# Patient Record
Sex: FEMALE | Race: OTHER | HISPANIC OR LATINO | Employment: FULL TIME | URBAN - METROPOLITAN AREA
[De-identification: names, ages, dates, MRNs, and addresses within clinical notes are randomized per-mention and may not be internally consistent; named-entity substitution may affect disease eponyms.]

---

## 2022-11-10 PROBLEM — E55.9 VITAMIN D DEFICIENCY: Status: ACTIVE | Noted: 2022-11-10

## 2022-11-10 PROBLEM — Z23 FLU VACCINE NEED: Status: ACTIVE | Noted: 2022-11-10

## 2023-02-28 PROBLEM — E66.813 OBESITY, CLASS III, BMI 40-49.9 (MORBID OBESITY): Status: ACTIVE | Noted: 2022-05-05

## 2023-04-02 ENCOUNTER — OFFICE VISIT (OUTPATIENT)
Dept: URGENT CARE | Facility: MEDICAL CENTER | Age: 23
End: 2023-04-02

## 2023-04-02 VITALS
OXYGEN SATURATION: 98 % | HEART RATE: 97 BPM | HEIGHT: 64 IN | WEIGHT: 260 LBS | BODY MASS INDEX: 44.39 KG/M2 | TEMPERATURE: 98.2 F | RESPIRATION RATE: 20 BRPM

## 2023-04-02 DIAGNOSIS — L70.0 ACNE VULGARIS: Primary | ICD-10-CM

## 2023-04-02 RX ORDER — CLINDAMYCIN PHOSPHATE 10 UG/ML
LOTION TOPICAL 2 TIMES DAILY
Qty: 60 ML | Refills: 0 | Status: SHIPPED | OUTPATIENT
Start: 2023-04-02

## 2023-04-02 NOTE — PROGRESS NOTES
[employer]        NAME: Joellen Moran is a 25 y o  female  : 2000    MRN: [id number]  DATE: 2023  TIME: 5:41 PM    Assessment and Plan   Acne vulgaris [L70 0]  1  Acne vulgaris  clindamycin (CLEOCIN T) 1 % lotion            Patient Instructions       Follow up with PCP as needed      Chief Complaint     Chief Complaint   Patient presents with   • Rash     Patient started yesterday with rash on her face yesterday and it is spreading down her neck  She used hydrocortisone cream on her face with no relief also a bendaryl tab  History of Present Illness       Patient with several day history of increasing rash on forehead cheeks and some down to her left chin  No new soaps or detergents  She tried 1 dose of Benadryl and some hydrocortisone which did not help  They are not itching  Review of Systems   Review of Systems   All other systems reviewed and are negative          Current Medications       Current Outpatient Medications:   •  clindamycin (CLEOCIN T) 1 % lotion, Apply topically 2 (two) times a day, Disp: 60 mL, Rfl: 0  •  norgestimate-ethinyl estradiol (Phoebe) 0 25-35 MG-MCG per tablet, Take 1 tablet by mouth daily, Disp: 84 tablet, Rfl: 1  •  spironolactone (ALDACTONE) 50 mg tablet, Take 1 tablet (50 mg total) by mouth daily, Disp: 90 tablet, Rfl: 1    Current Allergies     Allergies as of 2023   • (No Known Allergies)            The following portions of the patient's history were reviewed and updated as appropriate: allergies, current medications, past family history, past medical history, past social history, past surgical history and problem list      Past Medical History:   Diagnosis Date   • Polycystic ovarian syndrome    • Prediabetes        Past Surgical History:   Procedure Laterality Date   • NO PAST SURGERIES         Family History   Problem Relation Age of Onset   • No Known Problems Mother    • Diabetes Father    • Cancer Maternal Uncle    • No "Known Problems Maternal Grandmother    • Diabetes Maternal Grandfather    • Diabetes Paternal Grandmother    • Breast cancer Family    • Mental illness Neg Hx    • Substance Abuse Neg Hx          Medications have been verified  Objective   Pulse 97   Temp 98 2 °F (36 8 °C)   Resp 20   Ht 5' 4\" (1 626 m)   Wt 118 kg (260 lb)   LMP 03/20/2023   SpO2 98%   BMI 44 63 kg/m²   Patient's last menstrual period was 03/20/2023  Physical Exam     Physical Exam  Vitals and nursing note reviewed  Constitutional:       Appearance: Normal appearance  She is obese  Cardiovascular:      Rate and Rhythm: Normal rate and regular rhythm  Pulses: Normal pulses  Heart sounds: Normal heart sounds  Pulmonary:      Effort: Pulmonary effort is normal       Breath sounds: Normal breath sounds  Neurological:      Mental Status: She is alert         Cheeks and forehead several papules with erythema-acne          "

## 2023-05-25 ENCOUNTER — VBI (OUTPATIENT)
Dept: ADMINISTRATIVE | Facility: OTHER | Age: 23
End: 2023-05-25

## 2023-05-30 ENCOUNTER — TELEPHONE (OUTPATIENT)
Dept: BARIATRICS | Facility: CLINIC | Age: 23
End: 2023-05-30

## 2023-05-30 NOTE — TELEPHONE ENCOUNTER
Had  dial out patient's phone # but came back as being busy  Was not able to contact the patient in regards to rescheduling her 5/30/23 appointment with NEGRITO Hua that she No Showed for

## 2023-07-06 ENCOUNTER — OFFICE VISIT (OUTPATIENT)
Dept: INTERNAL MEDICINE CLINIC | Facility: CLINIC | Age: 23
End: 2023-07-06
Payer: COMMERCIAL

## 2023-07-06 VITALS
SYSTOLIC BLOOD PRESSURE: 138 MMHG | TEMPERATURE: 98.5 F | HEIGHT: 64 IN | DIASTOLIC BLOOD PRESSURE: 82 MMHG | HEART RATE: 79 BPM | OXYGEN SATURATION: 90 % | BODY MASS INDEX: 45.24 KG/M2 | WEIGHT: 265 LBS

## 2023-07-06 DIAGNOSIS — E66.01 CLASS 3 SEVERE OBESITY DUE TO EXCESS CALORIES WITHOUT SERIOUS COMORBIDITY WITH BODY MASS INDEX (BMI) OF 45.0 TO 49.9 IN ADULT (HCC): ICD-10-CM

## 2023-07-06 DIAGNOSIS — T78.40XA ALLERGIC REACTION, INITIAL ENCOUNTER: Primary | ICD-10-CM

## 2023-07-06 DIAGNOSIS — E78.2 MIXED HYPERLIPIDEMIA: ICD-10-CM

## 2023-07-06 DIAGNOSIS — E28.2 PCOD (POLYCYSTIC OVARIAN DISEASE): ICD-10-CM

## 2023-07-06 PROBLEM — E66.813 CLASS 3 SEVERE OBESITY DUE TO EXCESS CALORIES WITHOUT SERIOUS COMORBIDITY WITH BODY MASS INDEX (BMI) OF 45.0 TO 49.9 IN ADULT (HCC): Status: ACTIVE | Noted: 2022-05-05

## 2023-07-06 PROCEDURE — 99214 OFFICE O/P EST MOD 30 MIN: CPT | Performed by: INTERNAL MEDICINE

## 2023-07-06 NOTE — PROGRESS NOTES
Assessment/Plan:             1. Allergic reaction, initial encounter  -     Food Allergy Profile w/Reflex; Future  -     [facility] Allergy Panel, Adult; Future    2. Class 3 severe obesity due to excess calories without serious comorbidity with body mass index (BMI) of 45.0 to 49.9 in adult ([address])    3. Mixed hyperlipidemia    4. PCOD (polycystic ovarian disease)           Subjective:      Patient ID: Hai Gibbs is a 25 y.o. female. Redness swelling and redness around the eyes over the weekend,  was vomiting at the same time      The following portions of the patient's history were reviewed and updated as appropriate: She  has a past medical history of Polycystic ovarian syndrome and Prediabetes. She   Patient Active Problem List    Diagnosis Date Noted   • Allergic reaction 07/06/2023   • Vitamin D deficiency 11/10/2022   • Flu vaccine need 11/10/2022   • Mixed hyperlipidemia 11/10/2022   • PCOD (polycystic ovarian disease) 05/05/2022   • Prediabetes 05/05/2022   • Class 3 severe obesity due to excess calories without serious comorbidity with body mass index (BMI) of 45.0 to 49.9 in adult [facility]) 05/05/2022   • Uses birth control 05/05/2022   • Impaired fasting blood sugar 05/05/2022     She  has a past surgical history that includes No past surgeries. Her family history includes Breast cancer in her family; Cancer in her maternal uncle; Diabetes in her father, maternal grandfather, and paternal grandmother; No Known Problems in her maternal grandmother and mother. She  reports that she has never smoked. She has never used smokeless tobacco. She reports current alcohol use. She reports that she does not use drugs.   Current Outpatient Medications   Medication Sig Dispense Refill   • norgestimate-ethinyl estradiol (Phoebe) 0.25-35 MG-MCG per tablet Take 1 tablet by mouth daily 84 tablet 1   • spironolactone (ALDACTONE) 50 mg tablet Take 1 tablet (50 mg total) by mouth daily 90 tablet 1   • clindamycin (CLEOCIN T) 1 % lotion Apply topically 2 (two) times a day (Patient not taking: Reported on 7/6/2023) 60 mL 0     No current facility-administered medications for this visit. Current Outpatient Medications on File Prior to Visit   Medication Sig   • norgestimate-ethinyl estradiol (Phoebe) 0.25-35 MG-MCG per tablet Take 1 tablet by mouth daily   • spironolactone (ALDACTONE) 50 mg tablet Take 1 tablet (50 mg total) by mouth daily   • clindamycin (CLEOCIN T) 1 % lotion Apply topically 2 (two) times a day (Patient not taking: Reported on 7/6/2023)     No current facility-administered medications on file prior to visit. She has No Known Allergies. .    Review of Systems   Constitutional: Negative for chills and fever. HENT: Negative for congestion, ear pain and sore throat. Eyes: Negative for pain. Respiratory: Negative for cough and shortness of breath. Cardiovascular: Negative for chest pain and leg swelling. Gastrointestinal: Negative for abdominal pain, nausea and vomiting. Endocrine: Negative for polyuria. Genitourinary: Negative for difficulty urinating, frequency and urgency. Musculoskeletal: Negative for arthralgias and back pain. Skin: Negative for rash. Neurological: Negative for weakness and headaches. Psychiatric/Behavioral: Negative for sleep disturbance. The patient is not nervous/anxious. Objective:      /82 (BP Location: Left arm, Patient Position: Sitting, Cuff Size: Standard)   Pulse 79   Temp 98.5 °F (36.9 °C) (Temporal)   Ht 5' 4" (1.626 m)   Wt 120 kg (265 lb)   SpO2 90%   BMI 45.49 kg/m²     No results found for this or any previous visit (from the past 1344 hour(s)). Physical Exam  Constitutional:       Appearance: Normal appearance. HENT:      Head: Normocephalic. Right Ear: External ear normal.      Left Ear: External ear normal.      Nose: Nose normal. No congestion.       Mouth/Throat:      Mouth: Mucous membranes are moist.      Pharynx: Oropharynx is clear. No oropharyngeal exudate or posterior oropharyngeal erythema. Eyes:      Extraocular Movements: Extraocular movements intact. Conjunctiva/sclera: Conjunctivae normal.   Cardiovascular:      Rate and Rhythm: Normal rate and regular rhythm. Heart sounds: Normal heart sounds. No murmur heard. Pulmonary:      Effort: Pulmonary effort is normal.      Breath sounds: Normal breath sounds. No wheezing or rales. Abdominal:      General: Bowel sounds are normal. There is no distension. Palpations: Abdomen is soft. Tenderness: There is no abdominal tenderness. Musculoskeletal:         General: Normal range of motion. Cervical back: Normal range of motion and neck supple. Right lower leg: No edema. Left lower leg: No edema. Lymphadenopathy:      Cervical: No cervical adenopathy. Skin:     General: Skin is warm. Neurological:      General: No focal deficit present. Mental Status: She is alert and oriented to person, place, and time.

## 2023-07-07 ENCOUNTER — VBI (OUTPATIENT)
Dept: ADMINISTRATIVE | Facility: OTHER | Age: 23
End: 2023-07-07

## 2023-07-22 DIAGNOSIS — Z78.9 USES BIRTH CONTROL: ICD-10-CM

## 2023-07-24 RX ORDER — NORGESTIMATE AND ETHINYL ESTRADIOL 0.25-0.035
1 KIT ORAL DAILY
Qty: 84 TABLET | Refills: 1 | OUTPATIENT
Start: 2023-07-24

## 2023-07-28 DIAGNOSIS — E28.2 PCOD (POLYCYSTIC OVARIAN DISEASE): ICD-10-CM

## 2023-07-28 DIAGNOSIS — E66.01 SEVERE OBESITY DUE TO EXCESS CALORIES WITH SERIOUS COMORBIDITY AND BODY MASS INDEX (BMI) IN 99TH PERCENTILE FOR AGE IN PEDIATRIC PATIENT: ICD-10-CM

## 2023-07-28 RX ORDER — SPIRONOLACTONE 50 MG/1
50 TABLET, FILM COATED ORAL DAILY
Qty: 90 TABLET | Refills: 1 | OUTPATIENT
Start: 2023-07-28

## 2023-09-08 ENCOUNTER — OFFICE VISIT (OUTPATIENT)
Dept: BARIATRICS | Facility: CLINIC | Age: 23
End: 2023-09-08
Payer: COMMERCIAL

## 2023-09-08 VITALS
DIASTOLIC BLOOD PRESSURE: 75 MMHG | BODY MASS INDEX: 45.75 KG/M2 | HEART RATE: 99 BPM | HEIGHT: 64 IN | RESPIRATION RATE: 17 BRPM | SYSTOLIC BLOOD PRESSURE: 125 MMHG | WEIGHT: 268 LBS

## 2023-09-08 DIAGNOSIS — E78.2 MIXED HYPERLIPIDEMIA: ICD-10-CM

## 2023-09-08 DIAGNOSIS — E66.01 OBESITY, CLASS III, BMI 40-49.9 (MORBID OBESITY) (HCC): Primary | ICD-10-CM

## 2023-09-08 DIAGNOSIS — E28.2 PCOD (POLYCYSTIC OVARIAN DISEASE): ICD-10-CM

## 2023-09-08 DIAGNOSIS — E66.01 CLASS 3 SEVERE OBESITY DUE TO EXCESS CALORIES WITHOUT SERIOUS COMORBIDITY WITH BODY MASS INDEX (BMI) OF 45.0 TO 49.9 IN ADULT (HCC): ICD-10-CM

## 2023-09-08 DIAGNOSIS — Z13.1 SCREENING FOR DIABETES MELLITUS: ICD-10-CM

## 2023-09-08 DIAGNOSIS — Z13.29 SCREENING FOR THYROID DISORDER: ICD-10-CM

## 2023-09-08 PROCEDURE — 99214 OFFICE O/P EST MOD 30 MIN: CPT | Performed by: NURSE PRACTITIONER

## 2023-09-08 NOTE — PROGRESS NOTES
Assessment/Plan:     Class 3 severe obesity due to excess calories without serious comorbidity with body mass index (BMI) of 45.0 to 49.9 in adult [facility])  - Patient is pursuing Conservative Program. Previously completed about 4 weeks of Healthy [facility]ORE, then put on hold due to finances. - Thinking about weight loss surgery, but is not yet ready to start the process. Given information for online seminar and handout for weight loss surgery. She is aware she will need to quit tobacco in order to qualify for weight loss surgery. Referral to smoking cessation offered, but she declined. - Given handout for FDA approved weight loss medications. Not currently interested in medication. Reviewed that she is never obligated to start medication for weight loss. - Contraception: OCP  - Initial weight loss goal of 5-10% weight loss for improved health  - Check CBC, CMP, lipid panel, TSH, A1C, and fasting insulin. Initial: 260 lbs BMI 44.63  Last visit: 257.4 lbs BMI 44.18  Current: 268 lbs BMI 46  Change: +8 lbs (+11 lbs since the last office visit)  Goal: 190 lbs    Goals:  Do not skip meals. Start food logging. Nutrition recommendations per the dietician   1075 calories per day. Increase water intake to at least 64 oz daily. Continue reducing sugary beverages. Discussed exercise. Start cardiovascular exercise, such as walking 2 days per week for 10 minutes and gradually increase to goal of 5 days per week 30 minutes cardiovascular exercise and 2 days resistance training. Reviewed 30/60 rule. No NSAIDS or alcohol recommended following weight loss surgery. Tobacco cessation recommended. Pregnancy not recommended for at least 2 years following weight loss surgery. Mixed hyperlipidemia  - Will likely improve with weight loss and lifestyle changes. - Check lipid panel. PCOD (polycystic ovarian disease)  - Taking Pheobe and aldactone. Continue management with prescribing provider.               Jaiden Barnes was seen today for follow-up. Diagnoses and all orders for this visit:    Obesity, Class III, BMI 40-49.9 (morbid obesity) (HCC)  -     CBC and differential; Future  -     Comprehensive metabolic panel; Future  -     HEMOGLOBIN A1C W/ EAG ESTIMATION; Future  -     Insulin, fasting; Future  -     TSH, 3rd generation with Free T4 reflex; Future  -     Lipid Panel with Direct LDL reflex; Future    PCOD (polycystic ovarian disease)  -     CBC and differential; Future  -     Comprehensive metabolic panel; Future  -     HEMOGLOBIN A1C W/ EAG ESTIMATION; Future  -     Insulin, fasting; Future  -     TSH, 3rd generation with Free T4 reflex; Future  -     Lipid Panel with Direct LDL reflex; Future    Mixed hyperlipidemia  -     CBC and differential; Future  -     Comprehensive metabolic panel; Future  -     HEMOGLOBIN A1C W/ EAG ESTIMATION; Future  -     Insulin, fasting; Future  -     TSH, 3rd generation with Free T4 reflex; Future  -     Lipid Panel with Direct LDL reflex; Future    Screening for diabetes mellitus  -     CBC and differential; Future  -     Comprehensive metabolic panel; Future  -     HEMOGLOBIN A1C W/ EAG ESTIMATION; Future  -     Insulin, fasting; Future  -     TSH, 3rd generation with Free T4 reflex; Future  -     Lipid Panel with Direct LDL reflex; Future    Screening for thyroid disorder  -     CBC and differential; Future  -     Comprehensive metabolic panel; Future  -     HEMOGLOBIN A1C W/ EAG ESTIMATION; Future  -     Insulin, fasting; Future  -     TSH, 3rd generation with Free T4 reflex; Future  -     Lipid Panel with Direct LDL reflex;  Future    Class 3 severe obesity due to excess calories without serious comorbidity with body mass index (BMI) of 45.0 to 49.9 in adult [facility])      I have spent a total time of 30 minutes on 09/08/23 in caring for this patient including Risks and benefits of tx options, Instructions for management, Patient and family education, Risk factor reductions, Impressions, Counseling / Coordination of care and Obtaining or reviewing history  . Follow up in approximately 2 month nurse visit and 4 months with Non-Surgical Physician/Advanced Practitioner. Subjective:   Chief Complaint   Patient presents with   • Follow-up     MWM 3m f/u; waist-44in       Patient ID: Hilario Hernandez  is a 26 y.o. female with excess weight/obesity here to pursue weight management. Patient is pursuing Conservative Program.   Most recent notes and records were reviewed. HPI    Wt Readings from Last 10 Encounters:   09/08/23 122 kg (268 lb)   07/06/23 120 kg (265 lb)   04/02/23 118 kg (260 lb)   02/28/23 117 kg (257 lb 6.4 oz)   01/26/23 115 kg (254 lb)   12/15/22 117 kg (258 lb)   12/08/22 118 kg (260 lb 3.2 oz)   12/01/22 118 kg (261 lb 3.2 oz)   11/21/22 117 kg (257 lb)   11/17/22 117 kg (258 lb 6.4 oz)     Previously completed about 4 weeks of Healthy [facility], then put on hold due to finances. Found Healthy CORE helpful. Was last seen in follow-up in Feb 2023. Not food logging. Appetite controlled. No cravings in particular.        B- 2 eggs and 2 pieces of toast with butter   S- none  L- yogurt and fruit   S- none  D- rice and chicken and sometimes veggies    S- none    Hydration: 32 oz water daily, rare coffee, apple juice or orange juice 2-3 times per week, gatorade zero sugar 3 per week   Alcohol: 1-2 drinks per month  Smoking: vaping nicotine   Exercise: nothing formal   Occupation: working nights - works in [employer] and walks all day at work [phone] steps at work  Sleep: 6 hrs    Colonoscopy: N/A  Mammogram: N/A      The following portions of the patient's history were reviewed and updated as appropriate: allergies, current medications, past family history, past medical history, past social history, past surgical history, and problem list.    Family History   Problem Relation Age of Onset   • No Known Problems Mother    • Diabetes Father    • Cancer Maternal Uncle    • No Known Problems Maternal Grandmother    • Diabetes Maternal Grandfather    • Diabetes Paternal Grandmother    • Breast cancer Family    • Mental illness Neg Hx    • Substance Abuse Neg Hx         Review of Systems   HENT: Negative for sore throat. Respiratory: Negative for cough and shortness of breath. Cardiovascular: Negative for chest pain and palpitations. Gastrointestinal: Negative for abdominal pain, constipation, diarrhea, nausea and vomiting. Denies GERD   Musculoskeletal: Positive for back pain (chronic). Negative for arthralgias. Skin: Negative for rash. Psychiatric/Behavioral: Negative for suicidal ideas (or HI). Denies depression  + anxiety somewhat controlled        Objective:  /75   Pulse 99   Resp 17   Ht 5' 4" (1.626 m)   Wt 122 kg (268 lb)   BMI 46.00 kg/m²     Physical Exam  Vitals and nursing note reviewed. Constitutional   General appearance: Abnormal.  well developed and morbidly obese. Eyes No conjunctival injection. Ears, Nose, Mouth, and Throat Oral mucosa moist.   Pulmonary   Respiratory effort: No increased work of breathing or signs of respiratory distress. Cardiovascular    Examination of extremities for edema and/or varicosities: Normal.  no edema. Abdomen   Abdomen: Abnormal.  The abdomen was obese.     Musculoskeletal   Normal range of motion  Neurological   Gait and station: Normal.   Psychiatric   Orientation to person, place and time: Normal.    Affect: appropriate

## 2023-09-08 NOTE — ASSESSMENT & PLAN NOTE
- Patient is pursuing Conservative Program. Previously completed about 4 weeks of Healthy CORE, then put on hold due to finances. - Thinking about weight loss surgery, but is not yet ready to start the process. Given information for online seminar and handout for weight loss surgery. She is aware she will need to quit tobacco in order to qualify for weight loss surgery. Referral to smoking cessation offered, but she declined. - Given handout for FDA approved weight loss medications. Not currently interested in medication. Reviewed that she is never obligated to start medication for weight loss. - Contraception: OCP  - Initial weight loss goal of 5-10% weight loss for improved health  - Check CBC, CMP, lipid panel, TSH, A1C, and fasting insulin. Initial: 260 lbs BMI 44.63  Last visit: 257.4 lbs BMI 44.18  Current: 268 lbs BMI 46  Change: +8 lbs (+11 lbs since the last office visit)  Goal: 190 lbs    Goals:  Do not skip meals. Start food logging. Nutrition recommendations per the dietician   1075 calories per day. Increase water intake to at least 64 oz daily. Continue reducing sugary beverages. Discussed exercise. Start cardiovascular exercise, such as walking 2 days per week for 10 minutes and gradually increase to goal of 5 days per week 30 minutes cardiovascular exercise and 2 days resistance training. Reviewed 30/60 rule. No NSAIDS or alcohol recommended following weight loss surgery. Tobacco cessation recommended. Pregnancy not recommended for at least 2 years following weight loss surgery.

## 2023-11-08 ENCOUNTER — CLINICAL SUPPORT (OUTPATIENT)
Dept: BARIATRICS | Facility: CLINIC | Age: 23
End: 2023-11-08

## 2023-11-08 VITALS
SYSTOLIC BLOOD PRESSURE: 122 MMHG | HEART RATE: 90 BPM | WEIGHT: 272.2 LBS | RESPIRATION RATE: 16 BRPM | HEIGHT: 64 IN | DIASTOLIC BLOOD PRESSURE: 80 MMHG | BODY MASS INDEX: 46.47 KG/M2

## 2023-11-08 DIAGNOSIS — R63.5 ABNORMAL WEIGHT GAIN: Primary | ICD-10-CM

## 2023-11-08 PROCEDURE — RECHECK

## 2023-11-08 NOTE — PROGRESS NOTES
Patient last visit weight:  Patient current visit weight:   No medication   If you are taking phentermine or other oral weight loss medications, are you experiencing any of the following symptoms:  Headache:   Blurred Vision:   Chest Pain:   Palpitations: Insomnia:   SPECIFY ORAL MEDICATION AND DOSAGE:     If you are taking an injectable medication,  are you experiencing any of the following symptoms:  Bloating:   Nausea:  Vomiting:   Constipation:   Diarrhea:  SPECIFY INJECTABLE MEDICATION AND CURRENT DOSAGE:      Vitals:    Is BP less than 100/60? No  Is BP greater than 140/90? NO  Is HR greater than 100? nO  **If yes to any of the above, have patient relax and repeat in 5-10 minutes**    Repeat values:    Is BP less than 100/60? Is BP greater than 140/90? Is HR greater than 100?   **If values remain outside of ranges above, please consult provider for next steps**

## 2023-11-09 ENCOUNTER — OFFICE VISIT (OUTPATIENT)
Dept: INTERNAL MEDICINE CLINIC | Facility: CLINIC | Age: 23
End: 2023-11-09
Payer: COMMERCIAL

## 2023-11-09 VITALS
HEIGHT: 64 IN | OXYGEN SATURATION: 99 % | TEMPERATURE: 97.3 F | WEIGHT: 271 LBS | DIASTOLIC BLOOD PRESSURE: 84 MMHG | BODY MASS INDEX: 46.26 KG/M2 | SYSTOLIC BLOOD PRESSURE: 132 MMHG | HEART RATE: 88 BPM

## 2023-11-09 DIAGNOSIS — E66.01 CLASS 3 SEVERE OBESITY DUE TO EXCESS CALORIES WITHOUT SERIOUS COMORBIDITY WITH BODY MASS INDEX (BMI) OF 45.0 TO 49.9 IN ADULT (HCC): ICD-10-CM

## 2023-11-09 DIAGNOSIS — F34.1 DYSTHYMIC DISORDER: Primary | ICD-10-CM

## 2023-11-09 DIAGNOSIS — Z23 ENCOUNTER FOR IMMUNIZATION: ICD-10-CM

## 2023-11-09 PROCEDURE — 99214 OFFICE O/P EST MOD 30 MIN: CPT | Performed by: INTERNAL MEDICINE

## 2023-11-09 PROCEDURE — 90471 IMMUNIZATION ADMIN: CPT

## 2023-11-09 PROCEDURE — 90686 IIV4 VACC NO PRSV 0.5 ML IM: CPT

## 2023-11-09 RX ORDER — BUPROPION HYDROCHLORIDE 150 MG/1
150 TABLET ORAL EVERY MORNING
Qty: 30 TABLET | Refills: 1 | Status: SHIPPED | OUTPATIENT
Start: 2023-11-09 | End: 2024-05-07

## 2023-11-09 NOTE — PROGRESS NOTES
Assessment/Plan:             1. Dysthymic disorder  -     buPROPion (WELLBUTRIN XL) 150 mg 24 hr tablet; Take 1 tablet (150 mg total) by mouth every morning  -     Ambulatory referral to [employer]; Future    2. Encounter for immunization  -     influenza vaccine, quadrivalent, 0.5 mL, preservative-free, for adult and pediatric patients 6 mos+ (AFLURIA, FLUARIX, FLULAVAL, FLUZONE)    3. Class 3 severe obesity due to excess calories without serious comorbidity with body mass index (BMI) of 45.0 to 49.9 in [facility])           Subjective:      Patient ID: Star Quevedo is a 26 y.o. female. Not able to sleep, keep thinking, emotionally up-and-down, not suicidal        The following portions of the patient's history were reviewed and updated as appropriate: She  has a past medical history of Polycystic ovarian syndrome and Prediabetes. She   Patient Active Problem List    Diagnosis Date Noted    Dysthymic disorder 11/09/2023    Allergic reaction 07/06/2023    Vitamin D deficiency 11/10/2022    Encounter for immunization 11/10/2022    Mixed hyperlipidemia 11/10/2022    PCOD (polycystic ovarian disease) 05/05/2022    Prediabetes 05/05/2022    Class 3 severe obesity due to excess calories without serious comorbidity with body mass index (BMI) of 45.0 to 49.9 in [facility]) 05/05/2022    Uses birth control 05/05/2022    Impaired fasting blood sugar 05/05/2022     She  has a past surgical history that includes No past surgeries. Her family history includes Breast cancer in her family; Cancer in her maternal uncle; Diabetes in her father, maternal grandfather, and paternal grandmother; No Known Problems in her maternal grandmother and mother. She  reports that she has never smoked. She has never used smokeless tobacco. She reports current alcohol use. She reports that she does not use drugs.   Current Outpatient Medications   Medication Sig Dispense Refill    buPROPion (WELLBUTRIN XL) 150 mg 24 hr tablet Take 1 tablet (150 mg total) by mouth every morning 30 tablet 1    norgestimate-ethinyl estradiol (Phoebe) 0.25-35 MG-MCG per tablet Take 1 tablet by mouth daily 84 tablet 1    spironolactone (ALDACTONE) 50 mg tablet Take 1 tablet (50 mg total) by mouth daily 90 tablet 1     No current facility-administered medications for this visit. Current Outpatient Medications on File Prior to Visit   Medication Sig    norgestimate-ethinyl estradiol (Phoebe) 0.25-35 MG-MCG per tablet Take 1 tablet by mouth daily    spironolactone (ALDACTONE) 50 mg tablet Take 1 tablet (50 mg total) by mouth daily     No current facility-administered medications on file prior to visit. She has No Known Allergies. .    Review of Systems   Constitutional:  Negative for chills and fever. HENT:  Negative for congestion, ear pain and sore throat. Eyes:  Negative for pain. Respiratory:  Negative for cough and shortness of breath. Cardiovascular:  Negative for chest pain and leg swelling. Gastrointestinal:  Negative for abdominal pain, nausea and vomiting. Endocrine: Negative for polyuria. Genitourinary:  Negative for difficulty urinating, frequency and urgency. Musculoskeletal:  Negative for arthralgias and back pain. Skin:  Negative for rash. Neurological:  Negative for weakness and headaches. Psychiatric/Behavioral:  Negative for sleep disturbance. The patient is not nervous/anxious. Objective:      /84 (BP Location: Left arm, Patient Position: Sitting, Cuff Size: Standard)   Pulse 88   Temp (!) 97.3 °F (36.3 °C) (Temporal)   Ht 5' 4" (1.626 m)   Wt 123 kg (271 lb)   SpO2 99%   BMI 46.52 kg/m²     No results found for this or any previous visit (from the past 1344 hour(s)). Physical Exam  Constitutional:       Appearance: Normal appearance. HENT:      Head: Normocephalic. Right Ear: External ear normal.      Left Ear: External ear normal.      Nose: Nose normal. No congestion.       Mouth/Throat: Mouth: Mucous membranes are moist.      Pharynx: Oropharynx is clear. No oropharyngeal exudate or posterior oropharyngeal erythema. Eyes:      Extraocular Movements: Extraocular movements intact. Conjunctiva/sclera: Conjunctivae normal.   Cardiovascular:      Rate and Rhythm: Normal rate and regular rhythm. Heart sounds: Normal heart sounds. No murmur heard. Pulmonary:      Effort: Pulmonary effort is normal.      Breath sounds: Normal breath sounds. No wheezing or rales. Abdominal:      General: Abdomen is flat. There is no distension. Palpations: Abdomen is soft. Tenderness: There is no abdominal tenderness. Musculoskeletal:         General: Normal range of motion. Cervical back: Normal range of motion and neck supple. Right lower leg: No edema. Left lower leg: No edema. Lymphadenopathy:      Cervical: No cervical adenopathy. Skin:     General: Skin is warm. Neurological:      General: No focal deficit present. Mental Status: She is alert and oriented to person, place, and time.

## 2023-11-14 ENCOUNTER — TELEPHONE (OUTPATIENT)
Dept: PSYCHIATRY | Facility: CLINIC | Age: 23
End: 2023-11-14

## 2023-11-14 NOTE — TELEPHONE ENCOUNTER
Patient has been added to the Talk Therapy wait list with a referral.    Insurance: [id number]  Insurance Type:    Commercial []   Medicaid [x]   [place] (if applicable)   Medicare []  Location Preference: [facility]  Provider Preference: Karan Galarza  Virtual: Yes [] No [x]  Were outside resources sent: Yes [x] No []

## 2023-11-15 ENCOUNTER — VBI (OUTPATIENT)
Dept: ADMINISTRATIVE | Facility: OTHER | Age: 23
End: 2023-11-15

## 2023-12-14 ENCOUNTER — OFFICE VISIT (OUTPATIENT)
Dept: INTERNAL MEDICINE CLINIC | Facility: CLINIC | Age: 23
End: 2023-12-14
Payer: COMMERCIAL

## 2023-12-14 VITALS
DIASTOLIC BLOOD PRESSURE: 86 MMHG | OXYGEN SATURATION: 98 % | WEIGHT: 268 LBS | HEIGHT: 64 IN | BODY MASS INDEX: 45.75 KG/M2 | TEMPERATURE: 98.3 F | HEART RATE: 92 BPM | SYSTOLIC BLOOD PRESSURE: 132 MMHG

## 2023-12-14 DIAGNOSIS — F34.1 DYSTHYMIC DISORDER: Primary | ICD-10-CM

## 2023-12-14 DIAGNOSIS — E66.01 SEVERE OBESITY DUE TO EXCESS CALORIES WITH SERIOUS COMORBIDITY AND BODY MASS INDEX (BMI) IN 99TH PERCENTILE FOR AGE IN PEDIATRIC PATIENT: ICD-10-CM

## 2023-12-14 DIAGNOSIS — Z78.9 USES BIRTH CONTROL: ICD-10-CM

## 2023-12-14 DIAGNOSIS — E28.2 PCOD (POLYCYSTIC OVARIAN DISEASE): ICD-10-CM

## 2023-12-14 PROCEDURE — 99214 OFFICE O/P EST MOD 30 MIN: CPT | Performed by: INTERNAL MEDICINE

## 2023-12-14 RX ORDER — BUPROPION HYDROCHLORIDE 150 MG/1
150 TABLET ORAL EVERY MORNING
Qty: 90 TABLET | Refills: 1 | Status: SHIPPED | OUTPATIENT
Start: 2023-12-14 | End: 2024-03-13

## 2023-12-14 RX ORDER — NORGESTIMATE AND ETHINYL ESTRADIOL 0.25-0.035
1 KIT ORAL DAILY
Qty: 84 TABLET | Refills: 1 | Status: SHIPPED | OUTPATIENT
Start: 2023-12-14

## 2023-12-14 NOTE — PROGRESS NOTES
Assessment/Plan:             1. Dysthymic disorder  -     buPROPion (WELLBUTRIN XL) 150 mg 24 hr tablet; Take 1 tablet (150 mg total) by mouth every morning    2. Severe obesity due to excess calories with serious comorbidity and body mass index (BMI) in 99th percentile for age in pediatric patient     3. PCOD (polycystic ovarian disease)    4. Uses birth control  -     norgestimate-ethinyl estradiol (Phoebe) 0.25-35 MG-MCG per tablet; Take 1 tablet by mouth daily           Subjective:      Patient ID: Rg Bashir is a 26 y.o. female. Follow-up on multiple medical problems to ensure they are stable on current medications        The following portions of the patient's history were reviewed and updated as appropriate: She  has a past medical history of Polycystic ovarian syndrome and Prediabetes. She   Patient Active Problem List    Diagnosis Date Noted    Dysthymic disorder 11/09/2023    Allergic reaction 07/06/2023    Vitamin D deficiency 11/10/2022    Encounter for immunization 11/10/2022    Mixed hyperlipidemia 11/10/2022    PCOD (polycystic ovarian disease) 05/05/2022    Prediabetes 05/05/2022    Class 3 severe obesity due to excess calories without serious comorbidity with body mass index (BMI) of 45.0 to 49.9 in adult [facility]) 05/05/2022    Uses birth control 05/05/2022    Impaired fasting blood sugar 05/05/2022     She  has a past surgical history that includes No past surgeries. Her family history includes Breast cancer in her family; Cancer in her maternal uncle; Diabetes in her father, maternal grandfather, and paternal grandmother; No Known Problems in her maternal grandmother and mother. She  reports that she has never smoked. She has never used smokeless tobacco. She reports current alcohol use. She reports that she does not use drugs.   Current Outpatient Medications   Medication Sig Dispense Refill    buPROPion (WELLBUTRIN XL) 150 mg 24 hr tablet Take 1 tablet (150 mg total) by mouth every morning 90 tablet 1    norgestimate-ethinyl estradiol (Phoebe) 0.25-35 MG-MCG per tablet Take 1 tablet by mouth daily 84 tablet 1    spironolactone (ALDACTONE) 50 mg tablet Take 1 tablet (50 mg total) by mouth daily 90 tablet 1     No current facility-administered medications for this visit. Current Outpatient Medications on File Prior to Visit   Medication Sig    spironolactone (ALDACTONE) 50 mg tablet Take 1 tablet (50 mg total) by mouth daily    [DISCONTINUED] buPROPion (WELLBUTRIN XL) 150 mg 24 hr tablet Take 1 tablet (150 mg total) by mouth every morning    [DISCONTINUED] norgestimate-ethinyl estradiol (Phoebe) 0.25-35 MG-MCG per tablet Take 1 tablet by mouth daily     No current facility-administered medications on file prior to visit. She has No Known Allergies. .    Review of Systems   Constitutional:  Negative for chills and fever. HENT:  Negative for congestion, ear pain and sore throat. Eyes:  Negative for pain. Respiratory:  Negative for cough and shortness of breath. Cardiovascular:  Negative for chest pain and leg swelling. Gastrointestinal:  Negative for abdominal pain, nausea and vomiting. Endocrine: Negative for polyuria. Genitourinary:  Negative for difficulty urinating, frequency and urgency. Musculoskeletal:  Negative for arthralgias and back pain. Skin:  Negative for rash. Neurological:  Negative for weakness and headaches. Psychiatric/Behavioral:  Negative for sleep disturbance. The patient is not nervous/anxious. Objective:      /86 (BP Location: Left arm, Patient Position: Sitting, Cuff Size: Standard)   Pulse 92   Temp 98.3 °F (36.8 °C) (Temporal)   Ht 5' 4" (1.626 m)   Wt 122 kg (268 lb)   SpO2 98%   BMI 46.00 kg/m²     No results found for this or any previous visit (from the past 1344 hour(s)). Physical Exam  Constitutional:       Appearance: Normal appearance. HENT:      Head: Normocephalic.       Right Ear: External ear normal.      Left Ear: External ear normal.      Nose: Nose normal. No congestion. Mouth/Throat:      Mouth: Mucous membranes are moist.      Pharynx: Oropharynx is clear. No oropharyngeal exudate or posterior oropharyngeal erythema. Eyes:      Extraocular Movements: Extraocular movements intact. Conjunctiva/sclera: Conjunctivae normal.   Cardiovascular:      Rate and Rhythm: Normal rate and regular rhythm. Heart sounds: Normal heart sounds. No murmur heard. Pulmonary:      Effort: Pulmonary effort is normal.      Breath sounds: Normal breath sounds. No wheezing or rales. Abdominal:      General: Abdomen is flat. There is no distension. Palpations: Abdomen is soft. Tenderness: There is no abdominal tenderness. Musculoskeletal:         General: Normal range of motion. Cervical back: Normal range of motion and neck supple. Right lower leg: No edema. Left lower leg: No edema. Lymphadenopathy:      Cervical: No cervical adenopathy. Skin:     General: Skin is warm. Neurological:      General: No focal deficit present. Mental Status: She is alert and oriented to person, place, and time.

## 2024-02-14 ENCOUNTER — TELEPHONE (OUTPATIENT)
Dept: PSYCHIATRY | Facility: CLINIC | Age: 24
End: 2024-02-14

## 2024-02-14 NOTE — TELEPHONE ENCOUNTER
"Behavioral Health Outpatient Intake Questions    Referred By   : PCP    Please advise interviewee that they need to answer all questions truthfully to allow for best care, and any misrepresentations of information may affect their ability to be seen at this clinic   => Was this discussed? Yes     If Minor Child (under age 18)    Who is/are the legal guardian(s) of the child?     Is there a custody agreement? No     If \"YES\"- Custody orders must be obtained prior to scheduling the first appointment  In addition, Consent to Treatment must be signed by all legal guardians prior to scheduling the first appointment    If \"NO\"- Consent to Treatment must be signed by all legal guardians prior to scheduling the first appointment    Behavioral Health Outpatient Intake History -     Presenting Problem (in patient's own words): trust issue, parental issues    Are there any communication barriers for this patient?     No                                               If yes, please describe barriers:  none  If there is a unique situation, please refer to Ralph Thomson/Autumn Blood for final determination.    Are you taking any psychiatric medications? Yes     If \"YES\" -What are they Wellbutrin XL     If \"YES\" -Who prescribes?     Has the Patient previously received outpatient Talk Therapy or Medication Management from [facility]  No        If \"YES\"- When, Where and with Whom?         If \"NO\" -Has Patient received these services elsewhere?       If \"YES\" -When, Where, and with Whom?  Has the Patient abused alcohol or other substances in the last 6 months ? No  No concerns of substance abuse are reported.     If \"YES\" -What substance, How much, How often?     If illegal substance: Refer to [facility] (for GABRIELA) or [facility].   If Alcohol in excess of 10 drinks per week:  Refer to [facility] (for [facility]) or [facility]    Legal History-     Is this treatment court ordered? No   If \"yes \"send to :  Talk Therapy : " "Send to Ralph Thomson/Autumn Blood for final determination   Med Management: Send to Dr June for final determination     Has the Patient been convicted of a felony?  No   If \"Yes\" send to -When, What?  Talk Therapy : Send to Ralph Blood for final determination   Med Management: Send to Dr June for final determination     ACCEPTED as a patient Yes  If \"Yes\" Appointment Date: 2/15/2024 at 2pm with Vern Leora    Referred Elsewhere? No  If “Yes” - (Where? Ex: [facility], [facility], [facility], [facility], etc.)       Name of Insurance Co:[facility]  Insurance ID#     [id number]     Insurance Phone # [phone]   If ins is primary or secondary?primary  If patient is a minor, parents information such as Name, D.O.B of guarantor.   "

## 2024-02-15 ENCOUNTER — SOCIAL WORK (OUTPATIENT)
Dept: BEHAVIORAL/MENTAL HEALTH CLINIC | Facility: CLINIC | Age: 24
End: 2024-02-15
Payer: COMMERCIAL

## 2024-02-15 DIAGNOSIS — F33.1 MAJOR DEPRESSIVE DISORDER, RECURRENT, MODERATE (HCC): Primary | ICD-10-CM

## 2024-02-15 PROCEDURE — 90791 PSYCH DIAGNOSTIC EVALUATION: CPT

## 2024-02-15 NOTE — PSYCH
"Behavioral Health Psychotherapy Assessment    Date of Initial Psychotherapy Assessment: 02/15/24  Referral Source: PCP  Has a release of information been signed for the referral source? Yes    Preferred Name: Magnolia Jung  Preferred Pronouns: She/her  YOB: 2000 Age: 23 y.o.  Sex assigned at birth: female   Gender Identity: Female  Race:   Preferred Language: English    Emergency Contact:  Full Name: Ranjan Quesada  Relationship to Client: Mother  Contact information: [phone]    Primary Care Physician:  Dave Oleary MD  [address] [place] [id number]  [phone]  Has a release of information been signed? Yes    Physical Health History:  Past surgical procedures: None  Do you have a history of any of the following: other PCOS  Do you have any mobility issues? No    Relevant Family History:  Mother side-MDD, Diabetes, Heart Disease  Father side-MDD, Diabetes, Heart conditions    Presenting Problem (What brings you in?)  \"Been a lot going on in the past two years and think I need therapy. It's now effecting my sleep.\"    Mental Health Advance Directive:  Do you currently have a Mental Health Advance Directive?no    Diagnosis:   Diagnosis ICD-10-CM Associated Orders   1. Major depressive disorder, recurrent, moderate (HCC)  F33.1           Initial Assessment:     Current Mental Status:    Appearance: appropriate and casual      Behavior/Manner: cooperative      Speech:  Normal    Sleep:  Insomnia and interrupted    Oriented to: oriented to self, oriented to place and oriented to time       Clinical Symptoms    Depression: yes      Anxiety: yes      Bruna: yes      Depression Symptoms: depressed mood, restlessness, serious loss of interest in things, thoughts that death would be easier, social isolation, fatigue, indecision, poor concentration and insomnia      Anxiety Symptoms: excessive worry, fatigues easily, muscle tension, nervous/anxious, difficulty controlling worry and " restlessness      Bruna Symptoms: distinct period of elevated mood, excessive involvement in pleasure, racing thoughts, distractibility and psychomotor agitation      Have you ever been assaultive to others or the environment: No      Have you ever been self-injurious: Yes    Additional Abuse/Self Harm history:  2 years ago-cutting     Counseling History:  Previous Counseling or Treatment  (Mental Health or Drug & Alcohol): Yes    Previous Counseling Details:  Young child-doesn't remember the location  Have you previously taken psychiatric medications: No      Suicide Risk Assessment  Have you ever had a suicide attempt: No    Have you had incidents of suicidal ideation: Yes    Are you currently experiencing suicidal thoughts: No    Additional Suicide Risk Information:  Fleeting thoughts of wanting to die, in past wanted to have a car accident but never attempted    Substance Abuse/Addiction Assessment:  Alcohol: No    Heroin: No    Fentanyl: No    Opiates: No    Cocaine: No    Amphetamines: No    Hallucinogens: No    Club Drugs: No    Benzodiazepines: No    Other Rx Meds: No    Marijuana: Yes    Age of First Use:  2 years ago  Age of regular use:  1 year ago (22)  Frequency:  Daily  Amount:  6 hits on vape  Method:  Smoke/pipe  Last Use:  Today  Tobacco/Nicotine: Yes    Age of First Use:  2 years ago  Age of regular use:  21 y/o  Frequency:  Daily  Amount:  2 hits a day  Method:  Smoke/pipe  Last Use:  Today  Are you interested in resources for smoking cessation: No    Have you experienced blackouts as a result of substance use: No    Have you had any periods of abstinence: No    Have you experienced symptoms of withdrawal: No    Have you ever overdosed on any substances?: No    Are you currently using any Medication Assisted Treatment for Substance Use: No      Compulsive Behaviors:  Compulsive Behavior Information:  None    Disordered Eating History:  Do you have a history of disordered eating: No      Social  Determinants of Health:    SDOH:  Social isolation and stress    Trauma and Abuse History:    Have you ever been abused: No      Legal History:    Have you ever been arrested  or had a DUI: No      Have you been incarcerated: No      Are you currently on parole/probation: No      Any current Children and Youth involvement: No      Any pending legal charges: No      Relationship History:    Current marital status: single      Natural Supports:  Father, siblings and friends    Relationship History:  Mother-Due to Denominational we grew apart, other disagreements  Father-Supportive  Sister-Supportive, lives far away  3 best friends-supportive  Aunt-Supportive      Employment History    Are you currently employed: Yes      Longest period of employment:  3 years    Employer/ Job title:  [employer] work    Future work goals:  Comfortable for now    Sources of income/financial support:  Work     History:      Status: no history of  duty  Educational History:     Have you ever been diagnosed with a learning disability: No      Highest level of education:  Some college    School attended/attending:  [employer]    Have you ever had an IEP or 504-plan: No      Do you need assistance with reading or writing: No      Recommended Treatment:     Psychotherapy:  Individual sessions    Frequency:  2 times    Session frequency:  Monthly      Visit start and stop times:    02/15/24  Start Time: 0200  Stop Time: 0245  Total Visit Time: 45 minutes

## 2024-02-29 ENCOUNTER — SOCIAL WORK (OUTPATIENT)
Dept: BEHAVIORAL/MENTAL HEALTH CLINIC | Facility: CLINIC | Age: 24
End: 2024-02-29
Payer: COMMERCIAL

## 2024-02-29 DIAGNOSIS — F33.1 MAJOR DEPRESSIVE DISORDER, RECURRENT, MODERATE (HCC): Primary | ICD-10-CM

## 2024-02-29 PROCEDURE — 90834 PSYTX W PT 45 MINUTES: CPT

## 2024-02-29 NOTE — BH CRISIS PLAN
"Client Name: Magnolia Jung       Client YOB: 2000    [facility] Safety Plan      Creation Date: 2/29/24    Created By: Vern Loera       Step 1: Warning Signs:   Warning Signs   \"Driving at night\"   \"Silence\"   \"Isolating\"            Step 2: Internal Coping Strategies:   Internal Coping Strategies   \"Not that I can think of.\"            Step 3: People and social settings that provide distraction:   Name Contact Information   My friends if not busy (No specific friend) No direct phone contact    Places   \"Friends houses\"   \"Driving\"   \"My room\"           Step 4: People whom I can ask for help during a crisis:     Patient did not identify any contacts: Yes      Step 5: Professionals or agencies I can contact during a crisis:      Clinican/Agency Name Phone Emergency Contact    [facility] [phone] Vern Loera      [facility] Emergency Department Emergency Department Phone Emergency Department Address    [facility] [address]        Crisis Phone Numbers:   Suicide Prevention Lifeline: Call or Text  279 Crisis Text Line: Text HOME to [phone]   Please note: Some [facility] do not have a separate number for Child/Adolescent specific crisis. If your county is not listed under Child/Adolescent, please call the adult number for your county      Adult Crisis Numbers: Child/Adolescent Crisis Numbers   [facility]: [phone] [facility]: [phone]   [facility]: [phone] [facility]: [phone]   [facility]: [phone] [place]: [phone]   [facility]: [phone] [place]: [phone]   [place] [facility]: [phone]   [facility]: [phone]   [facility]: [phone]   [facility]: [phone] (daytime) [phone] (after hours, weekends, holidays)      Step 6: Making the environment safer (plan for lethal means safety):   Plan: None     Optional: What is most important to me and worth living " "for?   \"My sister would keep me going. My Aunt as well.\"     [place] Safety Plan. Madison Fink and Ryley Benavidez. Used with permission of the authors.           "

## 2024-02-29 NOTE — PSYCH
"Behavioral Health Psychotherapy Progress Note    Psychotherapy Provided: Individual Psychotherapy     1. Major depressive disorder, recurrent, moderate (HCC)            Goals addressed in session: Goal 1 and Goal 2     DATA: Magnolia arrived on time for her treatment planning session. During this session, this clinician used the following therapeutic modalities: Client-centered Therapy and Motivational Interviewing to complete her treatment plan and safety plan. Through discussion she decided that she would like to improve her self-esteem and value since she has struggled with this since her younger years. She would also like to work on trust and being able to feel comfortable around others because she has difficulty becoming vulnerable, especially since losing a loved one.     Substance Abuse was not addressed during this session. If the client is diagnosed with a co-occurring substance use disorder, please indicate any changes in the frequency or amount of use:  Stage of change for addressing substance use diagnoses: No substance use/Not applicable    ASSESSMENT:  Magnolia Jung presents with a Euthymic/ normal mood. her affect is Normal range and intensity, which is congruent, with her mood and the content of the session. The client has not made progress on their goals due to this being her treatment planning session.  Magnloia Jung presents with a none risk of suicide, none risk of self-harm, and none risk of harm to others.    For any risk assessment that surpasses a \"low\" rating, a safety plan must be developed.    A safety plan was indicated: no  If yes, describe in detail N/A    PLAN: Between sessions, Magnolia Jung will start to explore when she feels the most distrusting and potential triggering individuals. At the next session, the therapist will use Client-centered Therapy and Motivational Interviewing to address.    Behavioral Health Treatment Plan and Discharge Planning: Magnolia Jung is aware of " and agrees to continue to work on their treatment plan. They have identified and are working toward their discharge goals. no    Visit start and stop times:    02/29/24  Start Time: 1358  Stop Time: 1449  Total Visit Time: 51 minutes

## 2024-02-29 NOTE — BH TREATMENT PLAN
"Outpatient Behavioral Health Psychotherapy Treatment Plan    Magnolia Jung  2000     Date of Initial Psychotherapy Assessment: 02/15/2024   Date of Current Treatment Plan: 02/29/24  Treatment Plan Target Date: TBD  Treatment Plan Expiration Date: 08/25/2024    Diagnosis:   1. Major depressive disorder, recurrent, moderate (HCC)            Area(s) of Need: \"Self confidence, my family passed away last year and can't trust people.\"    Long Term Goal 1 (in the client's own words): \"I have trouble trusting people. I trust people 60% of the time and would like to increase to 85% out of 100%\"    Stage of Change: Action    Target Date for completion: TBD      Anticipated therapeutic modalities: CBT and DBT techniques will be used to accomplish this goal.      People identified to complete this goal: Rekha      Objective 1: (identify the means of measuring success in meeting the objective): Identify 3 triggering environments/behaviors that cause distrust of others      Objective 2: (identify the means of measuring success in meeting the objective): Learn 2 healthy management skills to create safer mental space for trusting others      Long Term Goal 2 (in the client's own words): \"I value myself at a 3 out of 10 and would like to increase self confidence and worth to a 6 out of 10.\"    Stage of Change: Preparation    Target Date for completion: TBD     Anticipated therapeutic modalities: CBT and DBT techniques will be used to accomplish goal     People identified to complete this goal: Rekha      Objective 1: (identify the means of measuring success in meeting the objective): Identify 3 main internal strengths that build value      Objective 2: (identify the means of measuring success in meeting the objective): Learn 2 skills to create healthier internal dialogue        I am currently under the care of a [facility] psychiatric provider: no    My [facility] psychiatric provider is: " "N/A    I am currently taking psychiatric medications: Yes, as prescribed (PCP prescribed)    I feel that I will be ready for discharge from mental health care when I reach the following (measurable goal/objective): Magnolia will have increased her trust from 60% to 80%-100% and increased her self confidence/worth from 3-10 to a 6-10 in the next 180 days.\"    For children and adults who have a legal guardian:   Has there been any change to custody orders and/or guardianship status? NA. If yes, attach updated documentation.    I have created my Crisis Plan and have been offered a copy of this plan    Behavioral Health Treatment Plan [facility]: Diagnosis and Treatment Plan explained to Magnolia Jung acknowledges an understanding of their diagnosis. Magnolia Jung agrees to this treatment plan.    I have been offered a copy of this Treatment Plan. yes        "

## 2024-03-10 ENCOUNTER — OCCMED (OUTPATIENT)
Dept: URGENT CARE | Facility: MEDICAL CENTER | Age: 24
End: 2024-03-10
Payer: OTHER MISCELLANEOUS

## 2024-03-10 DIAGNOSIS — S61.411A LACERATION OF RIGHT HAND WITHOUT FOREIGN BODY, INITIAL ENCOUNTER: Primary | ICD-10-CM

## 2024-03-10 PROCEDURE — G0382 LEV 3 HOSP TYPE B ED VISIT: HCPCS | Performed by: ORTHOPAEDIC SURGERY

## 2024-03-10 PROCEDURE — 99283 EMERGENCY DEPT VISIT LOW MDM: CPT | Performed by: ORTHOPAEDIC SURGERY

## 2024-03-14 ENCOUNTER — OFFICE VISIT (OUTPATIENT)
Dept: INTERNAL MEDICINE CLINIC | Facility: CLINIC | Age: 24
End: 2024-03-14
Payer: COMMERCIAL

## 2024-03-14 ENCOUNTER — SOCIAL WORK (OUTPATIENT)
Dept: BEHAVIORAL/MENTAL HEALTH CLINIC | Facility: CLINIC | Age: 24
End: 2024-03-14
Payer: COMMERCIAL

## 2024-03-14 VITALS
WEIGHT: 256 LBS | BODY MASS INDEX: 43.71 KG/M2 | OXYGEN SATURATION: 97 % | DIASTOLIC BLOOD PRESSURE: 82 MMHG | SYSTOLIC BLOOD PRESSURE: 130 MMHG | HEIGHT: 64 IN | TEMPERATURE: 97.8 F | HEART RATE: 91 BPM

## 2024-03-14 DIAGNOSIS — E28.2 PCOD (POLYCYSTIC OVARIAN DISEASE): ICD-10-CM

## 2024-03-14 DIAGNOSIS — F34.1 DYSTHYMIC DISORDER: ICD-10-CM

## 2024-03-14 DIAGNOSIS — F33.1 MAJOR DEPRESSIVE DISORDER, RECURRENT, MODERATE (HCC): Primary | ICD-10-CM

## 2024-03-14 DIAGNOSIS — Z00.00 ANNUAL PHYSICAL EXAM: ICD-10-CM

## 2024-03-14 DIAGNOSIS — E66.01 CLASS 3 SEVERE OBESITY DUE TO EXCESS CALORIES WITHOUT SERIOUS COMORBIDITY WITH BODY MASS INDEX (BMI) OF 45.0 TO 49.9 IN ADULT (HCC): Primary | ICD-10-CM

## 2024-03-14 DIAGNOSIS — R73.01 IMPAIRED FASTING BLOOD SUGAR: ICD-10-CM

## 2024-03-14 PROCEDURE — 90834 PSYTX W PT 45 MINUTES: CPT

## 2024-03-14 PROCEDURE — 99214 OFFICE O/P EST MOD 30 MIN: CPT | Performed by: INTERNAL MEDICINE

## 2024-03-14 PROCEDURE — 99395 PREV VISIT EST AGE 18-39: CPT | Performed by: INTERNAL MEDICINE

## 2024-03-14 NOTE — PROGRESS NOTES
ADULT ANNUAL PHYSICAL  [facility] INTERNAL MEDICINE [place]    NAME: Magnolia Jung  AGE: 23 y.o. SEX: female  : 2000     DATE: 3/14/2024     Assessment and Plan:     Problem List Items Addressed This Visit          Endocrine    PCOD (polycystic ovarian disease) - Primary    Impaired fasting blood sugar       Behavioral Health    Dysthymic disorder       Other    Class 3 severe obesity due to excess calories without serious comorbidity with body mass index (BMI) of 45.0 to 49.9 in adult (HCC)     Other Visit Diagnoses       Annual physical exam                Immunizations and preventive care screenings were discussed with patient today. Appropriate education was printed on patient's after visit summary.    Counseling:  Exercise: the importance of regular exercise/physical activity was discussed. Recommend exercise 3-5 times per week for at least 30 minutes.          No follow-ups on file.     Chief Complaint:     Chief Complaint   Patient presents with    Follow-up     3 month follow up PCOD      History of Present Illness:     Adult Annual Physical   Patient here for a comprehensive physical exam. The patient reports no problems.    Diet and Physical Activity  Diet/Nutrition: well balanced diet.   Exercise: moderate cardiovascular exercise.      Depression Screening  PHQ-2/9 Depression Screening           General Health  Sleep: sleeps well.   Hearing: normal - bilateral.  Vision: no vision problems.   Dental: no dental visits for >1 year.       /GYN Health  Follows with gynecology? yes   Last menstrual period: -  Contraceptive method: oral contraceptives, - .  History of STDs?: no.     Advanced Care Planning  Do you have an advanced directive? no  Do you have a durable medical power of ? yes  ACP document given to the patient? yes      Review of Systems:     Review of Systems   Constitutional:  Negative for chills and fever.   HENT:  Negative  for congestion, ear pain and sore throat.    Eyes:  Negative for pain.   Respiratory:  Negative for cough and shortness of breath.    Cardiovascular:  Negative for chest pain and leg swelling.   Gastrointestinal:  Negative for abdominal pain, nausea and vomiting.   Endocrine: Negative for polyuria.   Genitourinary:  Negative for difficulty urinating, frequency and urgency.   Musculoskeletal:  Negative for arthralgias and back pain.   Skin:  Negative for rash.   Neurological:  Negative for weakness and headaches.   Psychiatric/Behavioral:  Negative for sleep disturbance. The patient is not nervous/anxious.       Past Medical History:     Past Medical History:   Diagnosis Date    Polycystic ovarian syndrome     Prediabetes       Past Surgical History:     Past Surgical History:   Procedure Laterality Date    NO PAST SURGERIES        Social History:     Social History     Socioeconomic History    Marital status: Single     Spouse name: None    Number of children: None    Years of education: None    Highest education level: None   Occupational History    None   Tobacco Use    Smoking status: Never    Smokeless tobacco: Never   Vaping Use    Vaping status: Never Used   Substance and Sexual Activity    Alcohol use: Yes    Drug use: No    Sexual activity: None   Other Topics Concern    None   Social History Narrative    None     Social Determinants of Health     Financial Resource Strain: Not on file   Food Insecurity: Not on file   Transportation Needs: Not on file   Physical Activity: Not on file   Stress: Not on file   Social Connections: Not on file   Intimate Partner Violence: Not on file   Housing Stability: Not on file      Family History:     Family History   Problem Relation Age of Onset    No Known Problems Mother     Diabetes Father     Cancer Maternal Uncle     No Known Problems Maternal Grandmother     Diabetes Maternal Grandfather     Diabetes Paternal Grandmother     Breast cancer Family     Mental illness  "Neg Hx     Substance Abuse Neg Hx       Current Medications:     Current Outpatient Medications   Medication Sig Dispense Refill    buPROPion (WELLBUTRIN XL) 150 mg 24 hr tablet Take 1 tablet (150 mg total) by mouth every morning 90 tablet 1    norgestimate-ethinyl estradiol (Phoebe) 0.25-35 MG-MCG per tablet Take 1 tablet by mouth daily 84 tablet 1    spironolactone (ALDACTONE) 50 mg tablet Take 1 tablet (50 mg total) by mouth daily 90 tablet 1     No current facility-administered medications for this visit.      Allergies:     No Known Allergies   Physical Exam:     /82 (BP Location: Left arm, Patient Position: Sitting, Cuff Size: Large)   Pulse 91   Temp 97.8 °F (36.6 °C) (Temporal)   Ht 5' 4\" (1.626 m)   Wt 116 kg (256 lb)   SpO2 97%   BMI 43.94 kg/m²     Physical Exam  Vitals and nursing note reviewed.   Constitutional:       General: She is not in acute distress.     Appearance: She is well-developed.   HENT:      Head: Normocephalic and atraumatic.   Eyes:      Conjunctiva/sclera: Conjunctivae normal.   Cardiovascular:      Rate and Rhythm: Normal rate and regular rhythm.      Heart sounds: No murmur heard.  Pulmonary:      Effort: Pulmonary effort is normal. No respiratory distress.      Breath sounds: Normal breath sounds.   Abdominal:      Palpations: Abdomen is soft.      Tenderness: There is no abdominal tenderness.   Musculoskeletal:         General: No swelling.      Cervical back: Neck supple.   Skin:     General: Skin is warm and dry.      Capillary Refill: Capillary refill takes less than 2 seconds.   Neurological:      Mental Status: She is alert.   Psychiatric:         Mood and Affect: Mood normal.          Dave Oleary MD   [facility] INTERNAL MEDICINE [facility]"

## 2024-03-14 NOTE — PROGRESS NOTES
Assessment/Plan:             1. PCOD (polycystic ovarian disease)    2. Dysthymic disorder  Comments:  stable, continue same med    3. Impaired fasting blood sugar    4. Annual physical exam    5. Class 3 severe obesity due to excess calories without serious comorbidity with body mass index (BMI) of 45.0 to 49.9 in adult (HCC)  Comments:  continue weight management           Subjective:      Patient ID: Magnolia Jung is a 23 y.o. female.    Follow-up on multiple medical problems to ensure they are stable on current medication, also physical        The following portions of the patient's history were reviewed and updated as appropriate: She  has a past medical history of Polycystic ovarian syndrome and Prediabetes.  She   Patient Active Problem List    Diagnosis Date Noted    Dysthymic disorder 11/09/2023    Allergic reaction 07/06/2023    Vitamin D deficiency 11/10/2022    Encounter for immunization 11/10/2022    Mixed hyperlipidemia 11/10/2022    PCOD (polycystic ovarian disease) 05/05/2022    Prediabetes 05/05/2022    Class 3 severe obesity due to excess calories without serious comorbidity with body mass index (BMI) of 45.0 to 49.9 in adult (HCC) 05/05/2022    Uses birth control 05/05/2022    Impaired fasting blood sugar 05/05/2022     She  has a past surgical history that includes No past surgeries.  Her family history includes Breast cancer in her family; Cancer in her maternal uncle; Diabetes in her father, maternal grandfather, and paternal grandmother; No Known Problems in her maternal grandmother and mother.  She  reports that she has never smoked. She has never used smokeless tobacco. She reports current alcohol use. She reports that she does not use drugs.  Current Outpatient Medications   Medication Sig Dispense Refill    buPROPion (WELLBUTRIN XL) 150 mg 24 hr tablet Take 1 tablet (150 mg total) by mouth every morning 90 tablet 1    norgestimate-ethinyl estradiol (Phoebe) 0.25-35 MG-MCG per tablet Take 1  "tablet by mouth daily 84 tablet 1    spironolactone (ALDACTONE) 50 mg tablet Take 1 tablet (50 mg total) by mouth daily 90 tablet 1     No current facility-administered medications for this visit.     Current Outpatient Medications on File Prior to Visit   Medication Sig    buPROPion (WELLBUTRIN XL) 150 mg 24 hr tablet Take 1 tablet (150 mg total) by mouth every morning    norgestimate-ethinyl estradiol (Phoebe) 0.25-35 MG-MCG per tablet Take 1 tablet by mouth daily    spironolactone (ALDACTONE) 50 mg tablet Take 1 tablet (50 mg total) by mouth daily     No current facility-administered medications on file prior to visit.     She has No Known Allergies..    Review of Systems   Constitutional:  Negative for chills and fever.   HENT:  Negative for congestion, ear pain and sore throat.    Eyes:  Negative for pain.   Respiratory:  Negative for cough and shortness of breath.    Cardiovascular:  Negative for chest pain and leg swelling.   Gastrointestinal:  Negative for abdominal pain, nausea and vomiting.   Endocrine: Negative for polyuria.   Genitourinary:  Negative for difficulty urinating, frequency and urgency.   Musculoskeletal:  Negative for arthralgias and back pain.   Skin:  Negative for rash.   Neurological:  Negative for weakness and headaches.   Psychiatric/Behavioral:  Negative for sleep disturbance. The patient is not nervous/anxious.          Objective:      /82 (BP Location: Left arm, Patient Position: Sitting, Cuff Size: Large)   Pulse 91   Temp 97.8 °F (36.6 °C) (Temporal)   Ht 5' 4\" (1.626 m)   Wt 116 kg (256 lb)   SpO2 97%   BMI 43.94 kg/m²     No results found for this or any previous visit (from the past 1344 hour(s)).     Physical Exam  Constitutional:       Appearance: Normal appearance.   HENT:      Head: Normocephalic.      Right Ear: Tympanic membrane, ear canal and external ear normal.      Left Ear: Tympanic membrane, ear canal and external ear normal.      Nose: Nose normal. No " congestion.      Mouth/Throat:      Mouth: Mucous membranes are moist.      Pharynx: Oropharynx is clear. No oropharyngeal exudate or posterior oropharyngeal erythema.   Eyes:      Extraocular Movements: Extraocular movements intact.      Conjunctiva/sclera: Conjunctivae normal.   Cardiovascular:      Rate and Rhythm: Normal rate and regular rhythm.      Heart sounds: Normal heart sounds. No murmur heard.  Pulmonary:      Effort: Pulmonary effort is normal.      Breath sounds: Normal breath sounds. No wheezing or rales.   Abdominal:      General: Abdomen is flat. There is no distension.      Palpations: Abdomen is soft.      Tenderness: There is no abdominal tenderness.   Musculoskeletal:         General: Normal range of motion.      Cervical back: Normal range of motion and neck supple.      Right lower leg: No edema.      Left lower leg: No edema.   Lymphadenopathy:      Cervical: No cervical adenopathy.   Skin:     General: Skin is warm.   Neurological:      General: No focal deficit present.      Mental Status: She is alert and oriented to person, place, and time.

## 2024-03-15 NOTE — PSYCH
"Behavioral Health Psychotherapy Progress Note    Psychotherapy Provided: Individual Psychotherapy     1. Major depressive disorder, recurrent, moderate (HCC)            Goals addressed in session: Goal 1     DATA: Magnolia arrived on time for her individual session. During this session, this clinician used the following therapeutic modalities: Client-centered Therapy and Motivational Interviewing in attempts to build rapport with Magnolia. She had limited interest in speaking about goals or in general with therapist. Therapist asked basic questions to gain insight into Magnolia with limited success. Magnolia mainly stayed quiet during her session potentially out of anxiety.    Substance Abuse was not addressed during this session. If the client is diagnosed with a co-occurring substance use disorder, please indicate any changes in the frequency or amount of use:  Stage of change for addressing substance use diagnoses: No substance use/Not applicable    ASSESSMENT:  Magnolia Jung presents with an Anxious mood. her affect is flat with limited eye contact or communication which is congruent, with her mood but not the content of the session. The client has not made progress on their goals based on limited communication and no expressed change. Magnolia Jung presents with a none risk of suicide, none risk of self-harm, and none risk of harm to others.    For any risk assessment that surpasses a \"low\" rating, a safety plan must be developed.    A safety plan was indicated: no  If yes, describe in detail N/A    PLAN: Between sessions, Magnolia Jung will start to think about topics to discuss in her sessions which will assist her in working towards her goals. At the next session, the therapist will use Client-centered Therapy and Motivational Interviewing to address continued rapport building.    Behavioral Health Treatment Plan and Discharge Planning: Magnolia Jung is aware of and agrees to continue to work on their " treatment plan. They have identified and are working toward their discharge goals. no    Visit start and stop times:    03/14/2024    Start Time: 1605  Stop Time: 1646  Total Visit Time: 41 minutes

## 2024-03-28 ENCOUNTER — SOCIAL WORK (OUTPATIENT)
Dept: BEHAVIORAL/MENTAL HEALTH CLINIC | Facility: CLINIC | Age: 24
End: 2024-03-28

## 2024-03-28 DIAGNOSIS — F33.1 MAJOR DEPRESSIVE DISORDER, RECURRENT, MODERATE (HCC): Primary | ICD-10-CM

## 2024-03-29 NOTE — PSYCH
"Behavioral Health Psychotherapy Progress Note    Psychotherapy Provided: Individual Psychotherapy     1. Major depressive disorder, recurrent, moderate (HCC)            Goals addressed in session: Goal 1     DATA: Magnolia arrived on time for her individual session.During this session, this clinician used the following therapeutic modalities: Client-centered Therapy and Motivational Interviewing to gain further insight into Magnolia and her mental health. She shared a triggering event that happened recently with her mother including her mother's lack of validation and not accepting portions of her life. Therapist and Magnolia discussed how her trust issues could have been triggered by her mother's conversation and how this related to a trauma the mother was involved in earlier in her (Magnolia) life. This concern was discussed and possible coping skills were suggested for reducing the likelihood of panic attacks/triggering from this event or her mother in general.    Substance Abuse was not addressed during this session. If the client is diagnosed with a co-occurring substance use disorder, please indicate any changes in the frequency or amount of use:  Stage of change for addressing substance use diagnoses: No substance use/Not applicable    ASSESSMENT:  Magnolia Jung presents with a Euthymic/ normal mood. her affect is Normal range and intensity, which is congruent, with her mood and the content of the session. The client has made progress on their goals based on her ease of conversation with the therapist. Magnolia Jung presents with a none risk of suicide, none risk of self-harm, and none risk of harm to others.    For any risk assessment that surpasses a \"low\" rating, a safety plan must be developed.    A safety plan was indicated: no  If yes, describe in detail N/A    PLAN: Between sessions, Magnolia Jung will continue using coping skills like reading to help manage her anxiety and depression. At the next " session, the therapist will use Client-centered Therapy and Motivational Interviewing to address any new coping skills she has tried that help her mental stability.    Behavioral Health Treatment Plan and Discharge Planning: Magnolia Jung is aware of and agrees to continue to work on their treatment plan. They have identified and are working toward their discharge goals. yes    Visit start and stop times:    03/29/24  Start Time: 1406  Stop Time: 1500  Total Visit Time: 54 minutes

## 2024-04-10 ENCOUNTER — SOCIAL WORK (OUTPATIENT)
Dept: BEHAVIORAL/MENTAL HEALTH CLINIC | Facility: CLINIC | Age: 24
End: 2024-04-10

## 2024-04-10 DIAGNOSIS — F33.1 MAJOR DEPRESSIVE DISORDER, RECURRENT, MODERATE (HCC): Primary | ICD-10-CM

## 2024-04-10 PROCEDURE — 90837 PSYTX W PT 60 MINUTES: CPT

## 2024-04-10 NOTE — PSYCH
"Behavioral Health Psychotherapy Progress Note    Psychotherapy Provided: Individual Psychotherapy     1. Major depressive disorder, recurrent, moderate (HCC)            Goals addressed in session: Goal 1     DATA: Magnolia arrived on time for her individual session.During this session, this clinician used the following therapeutic modalities: Client-centered Therapy, Cognitive Behavioral Therapy, and Motivational Interviewing to discuss the topic of her mother which she requested at the beginning of session. She addressed her previous emotions about her mother but the confusion she had currently surrounding her feeling of needing a relationship with her mother. Therapist completed psycho-education on parent-child relationships and validated Magnolia's interest in maintaining a relationship. The depth of this relationship and her needs were discussed as well as new boundaries she may need to create if she is to stay emotionally safe.    Substance Abuse was not addressed during this session. If the client is diagnosed with a co-occurring substance use disorder, please indicate any changes in the frequency or amount of use:  Stage of change for addressing substance use diagnoses: No substance use/Not applicable    ASSESSMENT:  Magnolia Jung presents with a Euthymic/ normal mood. her affect is Normal range and intensity, which is congruent, with her mood and the content of the session. The client has made progress on their goals based on her interest to set boundaries and communication with therapist. Magnolia Jung presents with a none risk of suicide, none risk of self-harm, and none risk of harm to others.    For any risk assessment that surpasses a \"low\" rating, a safety plan must be developed.    A safety plan was indicated: no  If yes, describe in detail n/a    PLAN: Between sessions, Magnolia Jung will process what kind of relationship she would like to have with her parent. At the next session, the therapist " will use Client-centered Therapy, Motivational Interviewing, and Supportive Psychotherapy to address these attributes compared to the current relationship and if her expectations can be met.    Behavioral Health Treatment Plan and Discharge Planning: Magnolia Jung is aware of and agrees to continue to work on their treatment plan. They have identified and are working toward their discharge goals. yes    Visit start and stop times:    04/10/24  Start Time: 1401  Stop Time: 1503  Total Visit Time: 62 minutes

## 2024-04-25 ENCOUNTER — SOCIAL WORK (OUTPATIENT)
Dept: BEHAVIORAL/MENTAL HEALTH CLINIC | Facility: CLINIC | Age: 24
End: 2024-04-25

## 2024-04-25 DIAGNOSIS — F33.1 MAJOR DEPRESSIVE DISORDER, RECURRENT, MODERATE (HCC): Primary | ICD-10-CM

## 2024-04-25 PROCEDURE — 90837 PSYTX W PT 60 MINUTES: CPT

## 2024-04-25 NOTE — PSYCH
"Behavioral Health Psychotherapy Progress Note    Psychotherapy Provided: Individual Psychotherapy     1. Major depressive disorder, recurrent, moderate (HCC)            Goals addressed in session: Goal 1     DATA: Magnolia arrived on time for her individual appointment. During this session, this clinician used the following therapeutic modalities: Client-centered Therapy, Cognitive Behavioral Therapy, and Motivational Interviewing to discuss her progress in trusting individuals. Magnolia expressed having recent communication with her mother because she want to stay in contact but with boundaries. Therapist spoke with Magnolia about her success in allowing herself to trust.     Substance Abuse was not addressed during this session. If the client is diagnosed with a co-occurring substance use disorder, please indicate any changes in the frequency or amount of use: Stage of change for addressing substance use diagnoses: No substance use/Not applicable    ASSESSMENT:  Magnolia Jung presents with a Euthymic/ normal mood. her affect is Normal range and intensity, which is congruent, with her mood and the content of the session. The client has made progress on their goals based on her improvement in her relationships. Magnolia Jung presents with a none risk of suicide, none risk of self-harm, and none risk of harm to others.    For any risk assessment that surpasses a \"low\" rating, a safety plan must be developed.    A safety plan was indicated: no  If yes, describe in detail n/a    PLAN: Between sessions, Magnolia Jung will continue to work on building trust. At the next session, the therapist will use Client-centered Therapy and Motivational Interviewing to address connection and communication.    Behavioral Health Treatment Plan and Discharge Planning: Magnolia Jung is aware of and agrees to continue to work on their treatment plan. They have identified and are working toward their discharge goals. yes    Visit " start and stop times:    04/25/24  Start Time: 1400  Stop Time: 1454  Total Visit Time: 54 minutes

## 2024-05-06 ENCOUNTER — TELEPHONE (OUTPATIENT)
Dept: PSYCHIATRY | Facility: CLINIC | Age: 24
End: 2024-05-06

## 2024-05-06 NOTE — TELEPHONE ENCOUNTER
Patient is calling regarding cancelling an appointment.    Date/Time: 5/8/24 at 2pm    Reason: no reason given    Patient was rescheduled: YES [x] NO []  If yes, when was Patient reschedule for: 5/14/24 at 8am    Patient requesting call back to reschedule: YES [] NO [x]

## 2024-05-14 ENCOUNTER — SOCIAL WORK (OUTPATIENT)
Dept: BEHAVIORAL/MENTAL HEALTH CLINIC | Facility: CLINIC | Age: 24
End: 2024-05-14

## 2024-05-14 DIAGNOSIS — F33.1 MAJOR DEPRESSIVE DISORDER, RECURRENT, MODERATE (HCC): Primary | ICD-10-CM

## 2024-05-14 PROCEDURE — 90837 PSYTX W PT 60 MINUTES: CPT

## 2024-05-14 NOTE — PSYCH
"Behavioral Health Psychotherapy Progress Note    Psychotherapy Provided: Individual Psychotherapy     1. Major depressive disorder, recurrent, moderate (HCC)            Goals addressed in session: Goal 1     DATA: Magnolia arrived on time for her individual session. During this session, this clinician used the following therapeutic modalities: Client-centered Therapy, Cognitive Behavioral Therapy, and Motivational Interviewing to discuss a recent death in her family and her personal sense of expectations surrounding this. Therapist processed the unrealistic expectations Magnolia had on herself to support others that trigger her. More realistic expectations were discussed and coping skills to manage mental health while dealing with this issue were communicated.    Substance Abuse was not addressed during this session. If the client is diagnosed with a co-occurring substance use disorder, please indicate any changes in the frequency or amount of use: Stage of change for addressing substance use diagnoses: No substance use/Not applicable    ASSESSMENT:  Magnolia Jung presents with a Euthymic/ normal mood. her affect is Normal range and intensity, which is congruent, with her mood and the content of the session. The client has made progress on their goals based on her awareness of her faulty thinking habits. Magnolia Jung presents with a none risk of suicide, none risk of self-harm, and none risk of harm to others.    For any risk assessment that surpasses a \"low\" rating, a safety plan must be developed.    A safety plan was indicated: no  If yes, describe in detail n/a    PLAN: Between sessions, Magnolia Jung will attend a  service and balance her mental health with supporting others. At the next session, the therapist will use Client-centered Therapy, Cognitive Behavioral Therapy, and Motivational Interviewing to address how her experience was during this time.    Behavioral Health Treatment Plan and " Discharge Planning: Magnolia Jung is aware of and agrees to continue to work on their treatment plan. They have identified and are working toward their discharge goals. yes    Visit start and stop times:    05/14/24  Start Time: 0810  Stop Time: 0905  Total Visit Time: 55 minutes

## 2024-06-02 DIAGNOSIS — Z78.9 USES BIRTH CONTROL: ICD-10-CM

## 2024-06-02 RX ORDER — NORGESTIMATE AND ETHINYL ESTRADIOL 0.25-0.035
1 KIT ORAL DAILY
Qty: 84 TABLET | Refills: 1 | Status: SHIPPED | OUTPATIENT
Start: 2024-06-02

## 2024-06-03 ENCOUNTER — TELEPHONE (OUTPATIENT)
Dept: PSYCHIATRY | Facility: CLINIC | Age: 24
End: 2024-06-03

## 2024-06-03 NOTE — TELEPHONE ENCOUNTER
Patient called and lvm stating she needs to reschedule an appt, writer called patient back and lvm for her to call back at her earliest convenience

## 2024-06-06 ENCOUNTER — TELEPHONE (OUTPATIENT)
Dept: PSYCHIATRY | Facility: CLINIC | Age: 24
End: 2024-06-06

## 2024-06-06 NOTE — TELEPHONE ENCOUNTER
NO-SHOW LETTER MAILED TO Magnolia Jung.  ADDRESS: [address] [place] [address] [address]  [facility] [address]

## 2024-06-09 DIAGNOSIS — F34.1 DYSTHYMIC DISORDER: ICD-10-CM

## 2024-06-09 RX ORDER — BUPROPION HYDROCHLORIDE 150 MG/1
150 TABLET ORAL EVERY MORNING
Qty: 90 TABLET | Refills: 1 | Status: SHIPPED | OUTPATIENT
Start: 2024-06-09

## 2024-06-19 ENCOUNTER — SOCIAL WORK (OUTPATIENT)
Dept: BEHAVIORAL/MENTAL HEALTH CLINIC | Facility: CLINIC | Age: 24
End: 2024-06-19

## 2024-06-19 DIAGNOSIS — F33.1 MAJOR DEPRESSIVE DISORDER, RECURRENT, MODERATE (HCC): Primary | ICD-10-CM

## 2024-06-19 PROCEDURE — 90834 PSYTX W PT 45 MINUTES: CPT

## 2024-06-19 NOTE — PSYCH
"Behavioral Health Psychotherapy Progress Note    Psychotherapy Provided: Individual Psychotherapy     1. Major depressive disorder, recurrent, moderate (HCC)            Goals addressed in session: Goal 1     DATA: Magnolia arrived on time for her individual session. During this session, this clinician used the following therapeutic modalities: Client-centered Therapy, Cognitive Behavioral Therapy, and Motivational Interviewing to address recent events including family that has previously rejected her and a current friend she feels is rejecting her. Therapist asked clarifying questions about the events that led to her emotions and thoughts of \"closing the door on the relationship.\" Magnolia was able to identify fear and anxiety over sharing her emotions with her friend due to not wanting to be vulnerable as well as not hurt her friend. Role play was completed to practice potential outcomes or ways of handling a conversation with the friend. Magnolia was still processing whether she wanted to bring up her concerns with her friend at end of session. This will be continued at next session.    Substance Abuse was not addressed during this session. If the client is diagnosed with a co-occurring substance use disorder, please indicate any changes in the frequency or amount of use: Stage of change for addressing substance use diagnoses: No substance use/Not applicable    ASSESSMENT:  Magnolia Jung presents with a Euthymic/ normal mood. her affect is Normal range and intensity and Tearful, which is congruent, with her mood and the content of the session. The client has made progress on their goals based on her knowledge of depressive symptoms and onset triggers. Magnolia Jung presents with a none risk of suicide, none risk of self-harm, and none risk of harm to others.    For any risk assessment that surpasses a \"low\" rating, a safety plan must be developed.    A safety plan was indicated: no  If yes, describe in detail " n/a    PLAN: Between sessions, Magnolia Jung will journal about her emotions and thoughts on relationships. At the next session, the therapist will use Client-centered Therapy, Cognitive Behavioral Therapy, and Motivational Interviewing to address her fears of rejection in relationships.    Behavioral Health Treatment Plan and Discharge Planning: Magnolia Jung is aware of and agrees to continue to work on their treatment plan. They have identified and are working toward their discharge goals. yes    Visit start and stop times:    06/19/24  Start Time: 1402  Stop Time: 1452  Total Visit Time: 50 minutes

## 2024-07-03 ENCOUNTER — SOCIAL WORK (OUTPATIENT)
Dept: BEHAVIORAL/MENTAL HEALTH CLINIC | Facility: CLINIC | Age: 24
End: 2024-07-03

## 2024-07-03 DIAGNOSIS — F33.1 MAJOR DEPRESSIVE DISORDER, RECURRENT, MODERATE (HCC): Primary | ICD-10-CM

## 2024-07-03 PROCEDURE — 90837 PSYTX W PT 60 MINUTES: CPT

## 2024-07-03 NOTE — PSYCH
"Behavioral Health Psychotherapy Progress Note    Psychotherapy Provided: Individual Psychotherapy     1. Major depressive disorder, recurrent, moderate (HCC)            Goals addressed in session: Goal 1     DATA: Magnolia arrived on time for her individual session. During this session, this clinician used the following therapeutic modalities: Client-centered Therapy and Motivational Interviewing to address boundary setting with loved ones. Magnolia expressed having difficulty with setting boundaries with others, especially \"those in need of help or support.\" Therapist spoke to Magnolia about how not setting boundaries and negatively impact the relationship including honesty and trust. Magnolia shared examples of when she is fatigued but stays with someone or on the phone with someone because she feels it's appropriate as a friend to do so. She stated she would like to practice verbalizing her needs for space and communicating boundaries while maintaining friendships. Therapist completed role play to allow Magnolia to prepare for possible responses to her requests.      Substance Abuse was not addressed during this session. If the client is diagnosed with a co-occurring substance use disorder, please indicate any changes in the frequency or amount of use: Stage of change for addressing substance use diagnoses: No substance use/Not applicable    ASSESSMENT:  Magnolia Jung presents with a Euthymic/ normal mood. her affect is Normal range and intensity, which is congruent, with her mood and the content of the session. The client has made progress on their goals based on her recognition of limited boundaries and ability to identify emotions. Magnolia Jung presents with a none risk of suicide, none risk of self-harm, and none risk of harm to others.    For any risk assessment that surpasses a \"low\" rating, a safety plan must be developed.    A safety plan was indicated: no  If yes, describe in detail n/a    PLAN: Between " sessions, Magnolia Jung will speak to friends about boundaries. At the next session, the therapist will use Client-centered Therapy and Motivational Interviewing to address boundary setting and self value.    Behavioral Health Treatment Plan and Discharge Planning: Magnolia Jung is aware of and agrees to continue to work on their treatment plan. They have identified and are working toward their discharge goals. yes    Visit start and stop times:    07/03/24  Start Time: 1400  Stop Time: 1454  Total Visit Time: 54 minutes

## 2024-07-17 ENCOUNTER — SOCIAL WORK (OUTPATIENT)
Dept: BEHAVIORAL/MENTAL HEALTH CLINIC | Facility: CLINIC | Age: 24
End: 2024-07-17

## 2024-07-17 DIAGNOSIS — F33.1 MAJOR DEPRESSIVE DISORDER, RECURRENT, MODERATE (HCC): Primary | ICD-10-CM

## 2024-07-17 PROCEDURE — 90834 PSYTX W PT 45 MINUTES: CPT

## 2024-07-17 NOTE — PSYCH
"Behavioral Health Psychotherapy Progress Note    Psychotherapy Provided: Individual Psychotherapy     1. Major depressive disorder, recurrent, moderate (HCC)            Goals addressed in session: Goal 1     DATA: Magnolai arrived on time for her individual session. During this session, this clinician used the following therapeutic modalities: Client-centered Therapy, Cognitive Behavioral Therapy, and Motivational Interviewing to discuss her thoughts on moving out of [place] to start new. Therapist asked questions to gain insight into her thinking and reasons behind this thought. She expressed her only connection to [place] is her family which are negative influences on her. Magnolia stated her family impacts her mental health significantly in the negative and causes her to feel more depressed and unvalued. She has started to think about focusing on herself which led to considering moving outside of the state to start fresh and attempt to build healthier trusting relationships with others. Therapist and Magnolia processed positive and negative results of this potential life change and the steps she could take to begin this change.     Substance Abuse was not addressed during this session. If the client is diagnosed with a co-occurring substance use disorder, please indicate any changes in the frequency or amount of use: Stage of change for addressing substance use diagnoses: No substance use/Not applicable    ASSESSMENT:  Magnolia Jung presents with a Euthymic/ normal mood.     her affect is Normal range and intensity, which is congruent, with her mood and the content of the session. The client has made progress on their goals based on boundary setting and future mindset goals. Magnolia Jung presents with a none risk of suicide, none risk of self-harm, and none risk of harm to others.    For any risk assessment that surpasses a \"low\" rating, a safety plan must be developed.    A safety plan was indicated: No  If " yes, describe in detail N/A    PLAN: Between sessions, Magnloia Jung will look into environments that are calming and peaceful for self care. She will also continue journaling. At the next session, the therapist will use Client-centered Therapy and Motivational Interviewing to address her homework for self value and positive self care.    Behavioral Health Treatment Plan and Discharge Planning: Magnolia Jung is aware of and agrees to continue to work on their treatment plan. They have identified and are working toward their discharge goals. yes    Visit start and stop times:    07/17/24  Start Time: 1410  Stop Time: 1502  Total Visit Time: 52 minutes

## 2024-07-19 ENCOUNTER — OFFICE VISIT (OUTPATIENT)
Dept: URGENT CARE | Age: 24
End: 2024-07-19
Payer: COMMERCIAL

## 2024-07-19 VITALS
SYSTOLIC BLOOD PRESSURE: 122 MMHG | OXYGEN SATURATION: 99 % | HEART RATE: 94 BPM | DIASTOLIC BLOOD PRESSURE: 77 MMHG | RESPIRATION RATE: 20 BRPM | TEMPERATURE: 98.1 F

## 2024-07-19 DIAGNOSIS — S09.90XA INJURY OF HEAD, INITIAL ENCOUNTER: Primary | ICD-10-CM

## 2024-07-19 PROCEDURE — G0382 LEV 3 HOSP TYPE B ED VISIT: HCPCS | Performed by: PHYSICIAN ASSISTANT

## 2024-07-19 NOTE — PATIENT INSTRUCTIONS
You have a mild head injury. However, symptoms of a more serious problem, such as mild brain injury (concussion), or bruising or bleeding in the brain, may appear later. For this reason, stay with someone else and monitor for any of the symptoms listed below for the next 24 hours. Seek emergency medical care if your child has any of these symptoms over the next few hours to days:   · Headache  · Nausea or vomiting  · Dizziness  · Sensitivity to light or noise  · Unusual sleepiness or grogginess  · Trouble falling asleep  · Personality changes  · Vision changes  · Memory loss  · Confusion  · Trouble walking or clumsiness  · Loss of consciousness (even for a short time)  · Inability to be awakened  · Stiff neck  · Weakness or numbness in any part of the body  · Seizures
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
## 2024-07-19 NOTE — LETTER
July 19, 2024     Patient: Magnolia Jung  YOB: 2000  Date of Visit: 7/19/2024      To Whom it May Concern:    Magnolia Jung is under my professional care. Magnolia was seen in my office on 7/19/2024. Magnolia may return to work on 7/22/24 .    If you have any questions or concerns, please don't hesitate to call.         Sincerely,          Jihan Anand PA-C        CC: No Recipients

## 2024-07-31 ENCOUNTER — SOCIAL WORK (OUTPATIENT)
Dept: BEHAVIORAL/MENTAL HEALTH CLINIC | Facility: CLINIC | Age: 24
End: 2024-07-31

## 2024-07-31 DIAGNOSIS — F33.1 MAJOR DEPRESSIVE DISORDER, RECURRENT, MODERATE (HCC): Primary | ICD-10-CM

## 2024-07-31 PROCEDURE — 90834 PSYTX W PT 45 MINUTES: CPT

## 2024-07-31 NOTE — PSYCH
"Behavioral Health Psychotherapy Progress Note    Psychotherapy Provided: Individual Psychotherapy     1. Major depressive disorder, recurrent, moderate (HCC)            Goals addressed in session: Goal 1 and Goal 2     DATA: Magnolia arrived on time for her individual session. During this session, this clinician used the following therapeutic modalities: Client-centered Therapy and Motivational Interviewing to discuss a recent change in her life which has given her an increase in her positive self view. She expressed setting boundaries with family and sharing these with a family member who questioned her as to actions she has started to complete for a healthier life. Therapist asked clarifying questions to gain insight into how this change came about and how she feels after the discussion. Magnolia expressed positive feelings like pride when setting boundaries and higher sense of value after defining her rules for relationships and what her expectations are when having a relationship with others. Therapist and Magnolia discussed how she will continue using these boundaries and healthy relationship rules.    Substance Abuse was not addressed during this session. If the client is diagnosed with a co-occurring substance use disorder, please indicate any changes in the frequency or amount of use: Stage of change for addressing substance use diagnoses: No substance use/Not applicable    ASSESSMENT:  Magnolia Jung presents with a Euthymic/ normal mood. her affect is Normal range and intensity, which is congruent, with her mood and the content of the session. The client has made progress on their goals based on her recent choice to set boundaries and maintain them for her mental health. Magnolia Jung presents with a none risk of suicide, none risk of self-harm, and none risk of harm to others.    For any risk assessment that surpasses a \"low\" rating, a safety plan must be developed.    A safety plan was indicated: no  If " yes, describe in detail n/a    PLAN: Between sessions, Magnolia Jung will continue to practice boundaries and flexibility. At the next session, the therapist will use Client-centered Therapy and Motivational Interviewing to address self confidence.    Behavioral Health Treatment Plan and Discharge Planning: Magnolia Jung is aware of and agrees to continue to work on their treatment plan. They have identified and are working toward their discharge goals. yes    Visit start and stop times:    07/31/24  Start Time: 1411  Stop Time: 1454  Total Visit Time: 43 minutes

## 2024-08-19 ENCOUNTER — SOCIAL WORK (OUTPATIENT)
Dept: BEHAVIORAL/MENTAL HEALTH CLINIC | Facility: CLINIC | Age: 24
End: 2024-08-19

## 2024-08-19 DIAGNOSIS — F33.1 MAJOR DEPRESSIVE DISORDER, RECURRENT, MODERATE (HCC): Primary | ICD-10-CM

## 2024-08-19 PROCEDURE — 90832 PSYTX W PT 30 MINUTES: CPT

## 2024-08-19 NOTE — BH CRISIS PLAN
"Client Name: Magnolia Jung       Client YOB: 2000    [facility] Safety Plan      Creation Date: 8/19/24 Update Date: 8/19/25   Created By: Vern Loera Last Updated By: Vern Loera      Step 1: Warning Signs:   Warning Signs   \"Driving at night\"   \"Silence\"   \"Isolating\"            Step 2: Internal Coping Strategies:   Internal Coping Strategies   \"Chewing gum for anxiety\"   \"Redirection\"   \"Journal\"   \"Reading\"            Step 3: People and social settings that provide distraction:   Name Contact Information   My friends if not busy (No specific friend) No direct phone contact    Places   \"Friends houses\"   \"Driving\"   \"My room\"           Step 4: People whom I can ask for help during a crisis:     Patient did not identify any contacts: Yes      Step 5: Professionals or agencies I can contact during a crisis:      Clinican/Agency Name Phone Emergency Contact    [facility] [phone] Vern Loera      [facility] Emergency Department Emergency Department Phone Emergency Department Address    [facility] [address]        Crisis Phone Numbers:   Suicide Prevention Lifeline: Call or Text  917 Crisis Text Line: Text HOME to 574-332   Please note: Some [facility] do not have a separate number for Child/Adolescent specific crisis. If your county is not listed under Child/Adolescent, please call the adult number for your county      Adult Crisis Numbers: Child/Adolescent Crisis Numbers   [facility]: [phone] [facility]: [phone]   [facility]: [phone] [facility]: [phone]   [facility]: [phone] [place], [place]: [phone]   [facility]: [phone] [place]: [phone]   [place] [facility]: [phone]   [facility]: [phone]   [facility]: [phone]   [facility]: [phone] (daytime) [phone] (after hours, weekends, holidays)      Step 6: Making the environment safer (plan for lethal " "means safety):   Plan: None reported     Optional: What is most important to me and worth living for?   \"My sister would keep me going. My Aunt as well.\"     [place] Safety Plan. Madison Fink and Ryley Benavidez. Used with permission of the authors.           "

## 2024-08-19 NOTE — BH TREATMENT PLAN
"Outpatient Behavioral Health Psychotherapy Treatment Plan     Magnolia Jung  2000      Date of Initial Psychotherapy Assessment: 02/15/2024   Date of Current Treatment Plan: 08/19/24  Treatment Plan Target Date: TBD  Treatment Plan Expiration Date: 02/15/2024     Diagnosis:   1. Major depressive disorder, recurrent, moderate (HCC)                Area(s) of Need: \"Self confidence, my family passed away last year and can't trust people.\"     Long Term Goal 1 (in the client's own words): \"I have trouble trusting people. I trust people 60% of the time and would like to increase to 85% out of 100%\"     Stage of Change: Action     Target Date for completion: TBD              Anticipated therapeutic modalities: CBT and DBT techniques will be used to accomplish this goal.              People identified to complete this goal: Rekha                    Objective 1: (identify the means of measuring success in meeting the objective): Identify 3 triggering environments/behaviors that cause distrust of others                    Objective 2: (identify the means of measuring success in meeting the objective): Learn 2 healthy management skills to create safer mental space for trusting others      Progress: Magnolia struggles with finding healthy relationships to work on trusting individuals. She views others as having only negative thinking about her which impacts her ability to trust.      Long Term Goal 2 (in the client's own words): \"I value myself at a 5 out of 10 and would like to increase self confidence and worth to a 8 out of 10.\"     Stage of Change: Action     Target Date for completion: TBD             Anticipated therapeutic modalities: CBT and DBT techniques will be used to accomplish goal             People identified to complete this goal: Rekha                    Objective 1: (identify the means of measuring success in meeting the objective): Identify 3 more internal strengths " "that build value                    Objective 2: (identify the means of measuring success in meeting the objective): Learn 2 skills to create healthier internal dialogue    Progress: Magnolia has improved her self view from a 3-10 to a 5-10. She finds purpose and meaning in herself internally not just through others external views.         I am currently under the care of a [facility] psychiatric provider: no     My [facility] psychiatric provider is: N/A     I am currently taking psychiatric medications: Yes, as prescribed (PCP prescribed)     I feel that I will be ready for discharge from mental health care when I reach the following (measurable goal/objective): Magnolia will have increased her trust from 60% to 80%-100% and increased her self confidence/worth from 5-10 to a 8-10 in the next 180 days.\"     For children and adults who have a legal guardian:          Has there been any change to custody orders and/or guardianship status? NA. If yes, attach updated documentation.     I have created my Crisis Plan and have been offered a copy of this plan     Behavioral Health Treatment Plan [facility]: Diagnosis and Treatment Plan explained to Magnolia Jung acknowledges an understanding of their diagnosis. Magnolia Jung agrees to this treatment plan.     I have been offered a copy of this Treatment Plan. yes     "

## 2024-08-19 NOTE — PSYCH
"Behavioral Health Psychotherapy Progress Note    Psychotherapy Provided: Individual Psychotherapy     1. Major depressive disorder, recurrent, moderate (HCC)            Goals addressed in session: Goal 1     DATA: Magnolia arrived on time for her individual session. During this session, this clinician used the following therapeutic modalities: Client-centered Therapy and Motivational Interviewing to discuss her treatment plan update and adjust goals based on progress. Magnolia has been working on creating a better sense of self and believes she has made progress in this area. She reports still struggling with trusting others based on having limited supportive individuals in her life. She will continue with both goals moving forward.    Substance Abuse was not addressed during this session. If the client is diagnosed with a co-occurring substance use disorder, please indicate any changes in the frequency or amount of use:  Stage of change for addressing substance use diagnoses: No substance use/Not applicable    ASSESSMENT:  Magnolia Jung presents with a Euthymic/ normal mood. her affect is Normal range and intensity, which is congruent, with her mood and the content of the session. The client has made progress on their goals based on adjusting her goals for improvement. Magnolia Jung presents with a none risk of suicide, none risk of self-harm, and none risk of harm to others.    For any risk assessment that surpasses a \"low\" rating, a safety plan must be developed.    A safety plan was indicated: no  If yes, describe in detail n/a    PLAN: Between sessions, Magnolia Jung will continue working on progress to achieve goals. At the next session, the therapist will use Client-centered Therapy and Motivational Interviewing to address relationships and trust..    Behavioral Health Treatment Plan and Discharge Planning: Magnolia Jung is aware of and agrees to continue to work on their treatment plan. They have " identified and are working toward their discharge goals. yes    Visit start and stop times:    08/19/24  Start Time: 1409  Stop Time: 1445  Total Visit Time: 36 minutes

## 2024-08-22 DIAGNOSIS — E28.2 PCOD (POLYCYSTIC OVARIAN DISEASE): ICD-10-CM

## 2024-08-22 DIAGNOSIS — E66.01 SEVERE OBESITY DUE TO EXCESS CALORIES WITH SERIOUS COMORBIDITY AND BODY MASS INDEX (BMI) IN 99TH PERCENTILE FOR AGE IN PEDIATRIC PATIENT (HCC): ICD-10-CM

## 2024-08-22 RX ORDER — SPIRONOLACTONE 50 MG/1
50 TABLET, FILM COATED ORAL DAILY
Qty: 30 TABLET | Refills: 0 | Status: SHIPPED | OUTPATIENT
Start: 2024-08-22

## 2024-09-14 DIAGNOSIS — E66.01 SEVERE OBESITY DUE TO EXCESS CALORIES WITH SERIOUS COMORBIDITY AND BODY MASS INDEX (BMI) IN 99TH PERCENTILE FOR AGE IN PEDIATRIC PATIENT (HCC): ICD-10-CM

## 2024-09-14 DIAGNOSIS — E28.2 PCOD (POLYCYSTIC OVARIAN DISEASE): ICD-10-CM

## 2024-09-17 RX ORDER — SPIRONOLACTONE 50 MG/1
50 TABLET, FILM COATED ORAL DAILY
Qty: 90 TABLET | Refills: 1 | Status: SHIPPED | OUTPATIENT
Start: 2024-09-17

## 2024-09-25 ENCOUNTER — TELEPHONE (OUTPATIENT)
Dept: PSYCHIATRY | Facility: CLINIC | Age: 24
End: 2024-09-25

## 2024-10-23 ENCOUNTER — SOCIAL WORK (OUTPATIENT)
Dept: BEHAVIORAL/MENTAL HEALTH CLINIC | Facility: CLINIC | Age: 24
End: 2024-10-23

## 2024-10-23 DIAGNOSIS — F33.1 MAJOR DEPRESSIVE DISORDER, RECURRENT, MODERATE (HCC): Primary | ICD-10-CM

## 2024-10-23 PROCEDURE — 90837 PSYTX W PT 60 MINUTES: CPT

## 2024-10-23 NOTE — PSYCH
"Behavioral Health Psychotherapy Progress Note    Psychotherapy Provided: Individual Psychotherapy     1. Major depressive disorder, recurrent, moderate (HCC)            Goals addressed in session: Goal 1     DATA: Magnolia arrived on time for her individual session. During this session, this clinician used the following therapeutic modalities: Client-centered Therapy and Motivational Interviewing to check in since she had not been since in an extended period of time. She expressed that she has made some changes and break throughs in regards to self love and trust in others. She has a new partner and recently identified that she has no self love and that leading to not allowing herself to trust or love others. The partner has allowed her to feel anger and frustration about this and worked with her on how she can start progressing in her love. Magnolia has started to gain trust in her partner and shared trauma with them in conversations allowing vulnerability in their relationship. She reported being proud and feeling more comfortable since sharing this information and making changes.    Substance Abuse was not addressed during this session. If the client is diagnosed with a co-occurring substance use disorder, please indicate any changes in the frequency or amount of use: Stage of change for addressing substance use diagnoses: No substance use/Not applicable    ASSESSMENT:  Magnolia Jung presents with a Euthymic/ normal mood. her affect is Normal range and intensity, which is congruent, with her mood and the content of the session. The client has made progress on their goals based on her identification about her lack of self love and starting to build trust with others. Magnolia Jung presents with a none risk of suicide, none risk of self-harm, and none risk of harm to others.    For any risk assessment that surpasses a \"low\" rating, a safety plan must be developed.    A safety plan was indicated: no  If yes, " describe in detail n/a    PLAN: Between sessions, Magnolia Jung will continue to open up with her partner. She will continue to practice small self love steps including her partner since she feels safe with them. At the next session, the therapist will use Client-centered Therapy and Motivational Interviewing to address how her changes have been progressing.    Behavioral Health Treatment Plan and Discharge Planning: Magnolia Jung is aware of and agrees to continue to work on their treatment plan. They have identified and are working toward their discharge goals. yes    Visit start and stop times:    10/23/24  Start Time: 1401  Stop Time: 1458  Total Visit Time: 57 minutes

## 2024-11-06 ENCOUNTER — SOCIAL WORK (OUTPATIENT)
Dept: BEHAVIORAL/MENTAL HEALTH CLINIC | Facility: CLINIC | Age: 24
End: 2024-11-06

## 2024-11-06 DIAGNOSIS — F33.1 MAJOR DEPRESSIVE DISORDER, RECURRENT, MODERATE (HCC): Primary | ICD-10-CM

## 2024-11-06 PROCEDURE — 90834 PSYTX W PT 45 MINUTES: CPT

## 2024-11-06 NOTE — PSYCH
"Behavioral Health Psychotherapy Progress Note    Psychotherapy Provided: Individual Psychotherapy     1. Major depressive disorder, recurrent, moderate (HCC)            Goals addressed in session: Goal 2     DATA: Magnolia arrived on time for her individual session. During this session, this clinician used the following therapeutic modalities: Client-centered Therapy and Motivational Interviewing to discuss her progress with identity and value. She has been able to identify skills that allow her to be more vulnerable with her partner and verbalize emotions. Magnolia expressed positive feelings about herself when speaking about handling visiting her mother (mother is trigger individual). Therapist addressed this and commented on positive changes that she used to help manage this situation. Magnolia and her therapist spoke about how she can use these skills for other triggers.    Substance Abuse was not addressed during this session. If the client is diagnosed with a co-occurring substance use disorder, please indicate any changes in the frequency or amount of use: Stage of change for addressing substance use diagnoses: No substance use/Not applicable    ASSESSMENT:  Magnolia Jung presents with a Euthymic/ normal mood. her affect is Normal range and intensity, which is congruent, with her mood and the content of the session. The client has made progress on their goals based on her use of management and coping skills. Magnolia Jung presents with a none risk of suicide, none risk of self-harm, and none risk of harm to others.    For any risk assessment that surpasses a \"low\" rating, a safety plan must be developed.    A safety plan was indicated: no  If yes, describe in detail n/a    PLAN: Between sessions, Magnolia Jung will continue working on building of self identity. She will continue using using management skills. At the next session, the therapist will use Client-centered Therapy and Motivational Interviewing " to address continued progress of goals for vulnerability.    Behavioral Health Treatment Plan and Discharge Planning: Magnolia Jung is aware of and agrees to continue to work on their treatment plan. They have identified and are working toward their discharge goals. yes    Visit start and stop times:    11/06/24  Start Time: 1405  Stop Time: 1453  Total Visit Time: 48 minutes

## 2024-11-20 ENCOUNTER — SOCIAL WORK (OUTPATIENT)
Dept: BEHAVIORAL/MENTAL HEALTH CLINIC | Facility: CLINIC | Age: 24
End: 2024-11-20

## 2024-11-20 DIAGNOSIS — F33.1 MAJOR DEPRESSIVE DISORDER, RECURRENT, MODERATE (HCC): Primary | ICD-10-CM

## 2024-11-20 NOTE — PSYCH
Behavioral Health Psychotherapy Progress Note    Psychotherapy Provided: Individual Psychotherapy     1. Major depressive disorder, recurrent, moderate (HCC)            Goals addressed in session: Goal 1     DATA: Magnolia arrived on time for her individual session. During this session, this clinician used the following therapeutic modalities: Client-centered Therapy and Motivational Interviewing to discuss two relationships she is currently struggling with. She had her first disagreement with her new partner and was questioning if her negative emotions were valid because she did not feel like she should have negative emotions after a discussion. Therapist validated her emotions and expressed that this is normal, including psycho-education on how emotions are significant during times of distress which could include arguments with loved ones. This led to another relationship she felt negatively about currently. Her best friend has been distant and not responding to attempts for connection which is leading to anxiety and anger for Magnolia. Therapist asked clarifying questions to gain insight into the most recent contact and the strength of the friendship. Magnolia stated they had a neutral connection at last contact and she was unaware of any concerns but had not asked the friend. Therapist shared the importance of communicating these concerns since she had not attempted making phone contact (call) only through text. Role play of how she could address her feelings about the relationship and the pain/anxiety/confusion she feels currently since there has been no response from the friend. Magnolia decided she would try calling the friend within the next couple days to share her thoughts and emotions.    Substance Abuse was not addressed during this session. If the client is diagnosed with a co-occurring substance use disorder, please indicate any changes in the frequency or amount of use:  Stage of change for addressing  "substance use diagnoses: No substance use/Not applicable    ASSESSMENT:  Magnolia Jung presents with a Euthymic/ normal mood. her affect is Normal range and intensity, which is congruent, with her mood and the content of the session. The client has made progress on their goals based on her awareness of self and interest in addressing supports. Magnolia Jung presents with a none risk of suicide, none risk of self-harm, and none risk of harm to others.    For any risk assessment that surpasses a \"low\" rating, a safety plan must be developed.    A safety plan was indicated: no  If yes, describe in detail n/a    PLAN: Between sessions, Magnolia Jung will write down what she would like to share with her loved ones. At the next session, the therapist will use Client-centered Therapy and Motivational Interviewing to address how her attempts to express her emotions went with supports.    Behavioral Health Treatment Plan and Discharge Planning: Magnolia Jung is aware of and agrees to continue to work on their treatment plan. They have identified and are working toward their discharge goals. yes    Visit start and stop times:    11/20/24  Start Time: 1401  Stop Time: 1459  Total Visit Time: 58 minutes  "

## 2024-12-04 ENCOUNTER — SOCIAL WORK (OUTPATIENT)
Dept: BEHAVIORAL/MENTAL HEALTH CLINIC | Facility: CLINIC | Age: 24
End: 2024-12-04

## 2024-12-04 DIAGNOSIS — F33.1 MAJOR DEPRESSIVE DISORDER, RECURRENT, MODERATE (HCC): Primary | ICD-10-CM

## 2024-12-04 PROCEDURE — 90837 PSYTX W PT 60 MINUTES: CPT

## 2024-12-04 NOTE — PSYCH
"Behavioral Health Psychotherapy Progress Note    Psychotherapy Provided: Individual Psychotherapy     1. Major depressive disorder, recurrent, moderate (HCC)            Goals addressed in session: Goal 1     DATA:   During this session, this clinician used the following therapeutic modalities: Client-centered Therapy and Motivational Interviewing to discuss concerns with her partner. She is struggling with communicating issues and needs with her partner including independence and possessiveness. Therapist spoke with Magnolia about the importance of sharing needs and concerns along with role playing possible outcomes of speaking with partner. Magnolia would like to potentially bring in her partner to have a family session to communicate her issues.    Substance Abuse was not addressed during this session. If the client is diagnosed with a co-occurring substance use disorder, please indicate any changes in the frequency or amount of use: Stage of change for addressing substance use diagnoses: No substance use/Not applicable    ASSESSMENT:  Magnolia Jung presents with a Euthymic/ normal mood. her affect is Normal range and intensity, which is congruent, with her mood and the content of the session. The client has made progress on their goals based on her interest in building healthier relationships including trust and communication. Magnolia Jung presents with a none risk of suicide, none risk of self-harm, and none risk of harm to others.    For any risk assessment that surpasses a \"low\" rating, a safety plan must be developed.    A safety plan was indicated: no  If yes, describe in detail n/a    PLAN: Between sessions, Magnolia Jung will attempt to sit down and have a conversation with her partner. At the next session, the therapist will use Client-centered Therapy and Motivational Interviewing to address if a family session will occur and how she would like to proceed.    Behavioral Health Treatment Plan and " Discharge Planning: Magnolia Jung is aware of and agrees to continue to work on their treatment plan. They have identified and are working toward their discharge goals. yes    Visit start and stop times:    12/05/24  Start Time: 1405  Stop Time: 1505  Total Visit Time: 60 minutes

## 2024-12-18 ENCOUNTER — SOCIAL WORK (OUTPATIENT)
Dept: BEHAVIORAL/MENTAL HEALTH CLINIC | Facility: CLINIC | Age: 24
End: 2024-12-18

## 2024-12-18 DIAGNOSIS — F33.1 MAJOR DEPRESSIVE DISORDER, RECURRENT, MODERATE (HCC): Primary | ICD-10-CM

## 2024-12-18 NOTE — PSYCH
"Behavioral Health Psychotherapy Progress Note    Psychotherapy Provided: Individual Psychotherapy     1. Major depressive disorder, recurrent, moderate (HCC)            Goals addressed in session: Goal 1     DATA: Magnolia arrived on time for her individual session. During this session, this clinician used the following therapeutic modalities: Client-centered Therapy and Motivational Interviewing to discuss her conversations with a friend and her partner about topics of concern. Magnolia was able to have calm communication with both individuals and share her thoughts and feelings. Therapist asked how she felt after the conversations and if she had any positive outcomes. She stated she felt proud of herself and and calm after sharing her feelings with her best friend. It gave her a relief to know the feelings were valid. Therapist and aMgnolia spoke about how she could continue this behavior since she feels she is slowly building trust with these individuals but still struggles to trust and be honest all the time.     Substance Abuse was not addressed during this session. If the client is diagnosed with a co-occurring substance use disorder, please indicate any changes in the frequency or amount of use: Stage of change for addressing substance use diagnoses: No substance use/Not applicable    ASSESSMENT:  Magnolia Jung presents with a Euthymic/ normal mood. her affect is Normal range and intensity, which is congruent, with her mood and the content of the session. The client has made progress on their goals based on her verbalization of needs to her friends. Magnolia Jung presents with a none risk of suicide, none risk of self-harm, and none risk of harm to others.    For any risk assessment that surpasses a \"low\" rating, a safety plan must be developed.    A safety plan was indicated: no  If yes, describe in detail n/a    PLAN: Between sessions, Magnolia Jung will continue to communicate with her loved ones and " friends about her needs. At the next session, the therapist will use Client-centered Therapy and Motivational Interviewing to address trust and communication with others.    Behavioral Health Treatment Plan and Discharge Planning: Magnolia Jung is aware of and agrees to continue to work on their treatment plan. They have identified and are working toward their discharge goals. yes    Depression Follow-up Plan Completed: Not applicable    Visit start and stop times:    12/18/24  Start Time: 1402  Stop Time: 1451  Total Visit Time: 49 minutes

## 2025-01-03 ENCOUNTER — TELEPHONE (OUTPATIENT)
Dept: PSYCHIATRY | Facility: CLINIC | Age: 25
End: 2025-01-03

## 2025-01-03 NOTE — TELEPHONE ENCOUNTER
NO-SHOW LETTER MAILED TO Magnolia Jung.  ADDRESS: [address] [place] [address] [address] [place], [place] [id number]  SENT VIA [facility]

## 2025-01-03 NOTE — LETTER
25     Magnolia Jung   : 2000   [address]  [facility] [address]       It is the policy of [facility] to monitor and manage appointments that have been no-showed or cancelled with less than 48-hour notice. This is necessary to ensure that we are able to provide timely access for all patients to our providers. Undue numbers of unutilized appointments delays necessary medical care for all patients.      Dear Magnolia Jung       We are sorry that you missed your appointment with Vern Loera MS on 2025. It has been 3 weeks  since your last appointment. Your health and follow-up care are important to us. We want to make you aware of your next appointment with Vern Loera MS that is scheduled for Wednesday , 1/15/2025 / at 2:00  pm.    Please be aware that our office policy states that if you 'no show' two or more Therapy appointments without prior notice of cancellation within in a calendar year, you may be discharged from Therapy treatment.  We want to bring this to your attention now to prevent an interruption of your care.  If you have any questions about this policy, please call us at the number above.     If we do not hear from you within 10 business days to make a follow up appointment, we will assume you are no longer interested in care here.    Thank you in advance for your cooperation and assistance.       Sincerely,      [facility] Support Staff

## 2025-01-14 ENCOUNTER — TELEPHONE (OUTPATIENT)
Dept: PSYCHIATRY | Facility: CLINIC | Age: 25
End: 2025-01-14

## 2025-01-16 ENCOUNTER — DOCUMENTATION (OUTPATIENT)
Dept: BEHAVIORAL/MENTAL HEALTH CLINIC | Facility: CLINIC | Age: 25
End: 2025-01-16

## 2025-01-16 NOTE — PROGRESS NOTES
Patient provided new private insurance to [facility]. Provider and  staff informed patient that she would have to transfer to another provider, self pay, or be discharged due to her change in insurance. Patient requested JOSSELYN to another provider due to insurance change.

## 2025-01-28 ENCOUNTER — TELEPHONE (OUTPATIENT)
Dept: BEHAVIORAL/MENTAL HEALTH CLINIC | Facility: CLINIC | Age: 25
End: 2025-01-28

## 2025-01-28 NOTE — TELEPHONE ENCOUNTER
Provider called patient to inform them a JOSSELYN would be occurring due to the change to private insurance. Current provider does not take private insurance so patient will be administratively transferred. Patient has been informed that the process can take some time and to expect a call when a new therapist is available.

## 2025-01-30 ENCOUNTER — DOCUMENTATION (OUTPATIENT)
Dept: BEHAVIORAL/MENTAL HEALTH CLINIC | Facility: CLINIC | Age: 25
End: 2025-01-30

## 2025-01-30 DIAGNOSIS — F33.1 MAJOR DEPRESSIVE DISORDER, RECURRENT, MODERATE (HCC): Primary | ICD-10-CM

## 2025-01-30 NOTE — PROGRESS NOTES
TRANSFER SUMMARY    [facility]    Patient Name Magnolia Jung     Date of Birth: 24 y.o. 2000      MRN: [id number]    Admission Date:  2/24/24    Date of Transfer: January 30, 2025    Admission Diagnosis:     Major Depressive Disorder, recurrent, moderate    Current Diagnosis:     1. Major depressive disorder, recurrent, moderate (HCC)            Reason for Admission: Magnolia presented for treatment due to depression and anxiety. Primary complaints included DEPRESSIVE SYMPTOMS: depressed mood, sadness, low motivation, negative thoughts, difficulty sleeping, ANXIETY SYMPTOMS: feeling nervous, worrying about everyday issues, worrying daily, poor concentration, racing thoughts, intrusive thoughts, and MOOD INSTABILITY SYMPTOMS: irritability, racing thoughts, poor concentration, difficulty sleeping.     Progress in Treatment: Magnolia was seen for Individual Couseling. During the course of treatment she was seen for bi-weekly individual TT sessions. Magnolia worked on anxiety reduction, sense of self/identity, and trust in others/relationships. Magnolia worked towards acceptance that family members would not understand or fully accept her identity or choices. She also worked to communicate with supports as to her needs. She recently was able to begin a committed relationship which led her to begin working on trust and honesty. Magnolia has improved significantly during her time in therapy and is fully committed and interested in continuing to make positive changes..    Episodes of Higher Level of Care: No    Transfer request Initiated by: Psychiatrist: None Therapist:  Vern Loera (Insurance)    Reason for Transfer Request:  Change in insurance    Does this individual need a clinician with specialized training/expertise?: No    Is this client working with any other [facility] Providers/Therapists? Psychiatrist: None Therapist: None    Other pertinent issues: None    Are  there any specific individuals who would be a “best fit” or who have already agreed to accept this transfer request?      Psychiatrist: None   Therapist: None  Rationale: Not Applicable    Attempts to maintain the current therapeutic relationship: Not Applicable    Transfer request routed to    for input and/or approval.     Comments from other involved providers and/or clinical coordinator : Transfer required due to insurance    Vern Loera01/30/25

## 2025-02-01 DIAGNOSIS — Z78.9 USES BIRTH CONTROL: ICD-10-CM

## 2025-02-01 RX ORDER — NORGESTIMATE AND ETHINYL ESTRADIOL 0.25-0.035
1 KIT ORAL DAILY
Qty: 84 TABLET | Refills: 1 | Status: SHIPPED | OUTPATIENT
Start: 2025-02-01

## 2025-02-03 ENCOUNTER — TELEPHONE (OUTPATIENT)
Dept: BEHAVIORAL/MENTAL HEALTH CLINIC | Facility: CLINIC | Age: 25
End: 2025-02-03

## 2025-02-03 NOTE — TELEPHONE ENCOUNTER
Called and LVM to patient requesting a call back so staff can arrange a JOSSELYN appt.    Patient can no longer see provider ( SUDHEER EDUARDO) since her insurance is now commercial.

## 2025-02-24 ENCOUNTER — TELEPHONE (OUTPATIENT)
Dept: PSYCHIATRY | Facility: CLINIC | Age: 25
End: 2025-02-24

## 2025-02-24 ENCOUNTER — DOCUMENTATION (OUTPATIENT)
Dept: BEHAVIORAL/MENTAL HEALTH CLINIC | Facility: CLINIC | Age: 25
End: 2025-02-24

## 2025-02-24 DIAGNOSIS — F33.1 MAJOR DEPRESSIVE DISORDER, RECURRENT, MODERATE (HCC): Primary | ICD-10-CM

## 2025-02-24 NOTE — PROGRESS NOTES
TRANSFER SUMMARY    [facility]    Patient Name Magnolia Jung     Date of Birth: 24 y.o. 2000      MRN: [id number]    Admission Date:  02/15/24    Date of Transfer: February 24, 2025    Admission Diagnosis:     Major Depressive Disorder, recurrent, moderate    Current Diagnosis:     1. Major depressive disorder, recurrent, moderate (HCC)            Reason for Admission: Magnolia presented for treatment due to depression, depressive symptoms, anxiety, anxiety symptoms, and manic symptoms. Primary complaints included DEPRESSIVE SYMPTOMS: depressed mood, sadness, hopelessness, low motivation, poor concentration, negative thoughts, passive death wish, ANXIETY SYMPTOMS: daily anxiety symptoms, worrying, worrying daily, poor concentration, racing thoughts, intrusive thoughts, and MOOD INSTABILITY SYMPTOMS: racing thoughts, poor concentration, difficulty sleeping.     Progress in Treatment: Magnolia was seen for Individual Couseling. During the course of treatment she worked on two goals determined during her intake process. These include trust in others and self worth/confidence. Throughout her time in therapy she has made progress in building trusting relationships. She has been able to set boundaries with unhealthy individuals around her and begin a new romantic relationship. Through working on trust and new relationships, she has been able to do self growth including confidence. She will continue to work on these goals with new JOSSELYN provider.    Episodes of Higher Level of Care: No    Transfer request Initiated by: Psychiatrist: None Therapist:  Vern Loera MS-administrative JOSSELYN due to insurance    Reason for Transfer Request:  Administrative due to insurance    Does this individual need a clinician with specialized training/expertise?: No    [facility]? Psychiatrist: None Therapist:  Vern Loera  MS    Other pertinent issues: Major Depressive Disorder    Are there any specific individuals who would be a “best fit” or who have already agreed to accept this transfer request?      Psychiatrist: None   Therapist:  Swati Wilson  Rationale: Not Applicable    Attempts to maintain the current therapeutic relationship: Not Applicable    Transfer request routed to    for input and/or approval.         Vern Loera02/24/25

## 2025-02-25 ENCOUNTER — TELEPHONE (OUTPATIENT)
Dept: PSYCHIATRY | Facility: CLINIC | Age: 25
End: 2025-02-25

## 2025-02-25 NOTE — TELEPHONE ENCOUNTER
COURTNEY for PT as writer was verifying insurance and Workmans Comp Ins. Does not have a way to verify by phone number and there is no member ID. When PT calls back please transfer to this writer

## 2025-02-26 ENCOUNTER — OFFICE VISIT (OUTPATIENT)
Dept: BEHAVIORAL/MENTAL HEALTH CLINIC | Facility: CLINIC | Age: 25
End: 2025-02-26
Payer: COMMERCIAL

## 2025-02-26 DIAGNOSIS — F34.1 DYSTHYMIC DISORDER: Primary | ICD-10-CM

## 2025-02-26 PROCEDURE — 90834 PSYTX W PT 45 MINUTES: CPT | Performed by: GENERAL ACUTE CARE HOSPITAL

## 2025-02-26 NOTE — PSYCH
"Treatment Plan Tracking    # 3Treatment Plan not completed within required time limits due to:  Pt is a JOSSELYN from P provider. Today's JOSSELYN session covered an overview of progress achieved in talk therapy to date, PHQ-9 completion, and anticipated ongoing talk therapy needs .       Behavioral Health Psychotherapy Progress Note    Psychotherapy Provided: Individual Psychotherapy     1. Dysthymic disorder            Goals addressed in session: Goal 1 and Goal 2     DATA: JOSSELYN occurred during today's session. PHQ-9 was completed yielding a score 9 indicating presence of mild depressive symptoms. Experienced symptoms were reviewed with Magnolia whom shared feeling depressed at least two days a week for a total of five hourse per day. She describes being able to \"shake it off\" referring to depressive symptoms. LCSW explored anti-depressant compliance and Magnolia confirmed adherence to anti-depressant regimen. Of note, Wellbutrin is prescribed/managed by her PCP. LCSW clarified limitations of PCP involvement for medication management vs psychiatry as a specialty service providing oversight. Magnolia shared she will \"think about it\". Session ended with LCSW asking Magnolia to consider additional talk therapy goals as treatment plan dated   on . Treatment plan will be renewed next session. LCSW provided handouts covering window of tolerance, cognitive restructuring, emotions, and cognitive distortions.       During this session, this clinician used the following therapeutic modalities: Cognitive Behavioral Therapy and Dialectical Behavior Therapy    Substance Abuse was not addressed during this session. If the client is diagnosed with a co-occurring substance use disorder, please indicate any changes in the frequency or amount of use. Stage of change for addressing substance use diagnoses: No substance use/Not applicable    ASSESSMENT:  Magnolia Jung presents with a Euthymic/ normal mood.     her affect is " "Normal range and intensity, which is congruent, with her mood and the content of the session. The client has made progress on their goals.     Magnolia Jung presents with a none risk of suicide, none risk of self-harm, and none risk of harm to others.    For any risk assessment that surpasses a \"low\" rating, a safety plan must be developed.    A safety plan was indicated: no  If yes, describe in detail na    PLAN: Between sessions, Magnolia Jung will consider additional treatment plan goals. At the next session, the therapist will use Cognitive Behavioral Therapy and Dialectical Behavior Therapy to address identified cognitive distortions and emotion dysregulation.    Behavioral Health Treatment Plan and Discharge Planning: Magnolia Jung is aware of and agrees to continue to work on their treatment plan. They have identified and are working toward their discharge goals. yes    Depression Follow-up Plan Completed: Not applicable    Visit start and stop times:    02/26/25  Start Time: 1012  Stop Time: 1100  Total Visit Time: 48 minutes  "

## 2025-02-27 ENCOUNTER — RA CDI HCC (OUTPATIENT)
Dept: OTHER | Facility: HOSPITAL | Age: 25
End: 2025-02-27

## 2025-03-27 DIAGNOSIS — E28.2 PCOD (POLYCYSTIC OVARIAN DISEASE): ICD-10-CM

## 2025-03-27 DIAGNOSIS — E66.01 SEVERE OBESITY DUE TO EXCESS CALORIES WITH SERIOUS COMORBIDITY AND BODY MASS INDEX (BMI) IN 99TH PERCENTILE FOR AGE IN PEDIATRIC PATIENT (HCC): ICD-10-CM

## 2025-03-27 RX ORDER — SPIRONOLACTONE 50 MG/1
50 TABLET, FILM COATED ORAL DAILY
Qty: 90 TABLET | Refills: 0 | Status: SHIPPED | OUTPATIENT
Start: 2025-03-27

## 2025-04-11 DIAGNOSIS — F34.1 DYSTHYMIC DISORDER: ICD-10-CM

## 2025-04-11 RX ORDER — BUPROPION HYDROCHLORIDE 150 MG/1
150 TABLET ORAL EVERY MORNING
Qty: 90 TABLET | Refills: 1 | Status: SHIPPED | OUTPATIENT
Start: 2025-04-11